# Patient Record
Sex: MALE | Race: WHITE
[De-identification: names, ages, dates, MRNs, and addresses within clinical notes are randomized per-mention and may not be internally consistent; named-entity substitution may affect disease eponyms.]

---

## 2017-01-06 ENCOUNTER — RECORDS - HEALTHEAST (OUTPATIENT)
Dept: ADMINISTRATIVE | Facility: OTHER | Age: 22
End: 2017-01-06

## 2017-07-24 ENCOUNTER — OFFICE VISIT - HEALTHEAST (OUTPATIENT)
Dept: INTERNAL MEDICINE | Facility: CLINIC | Age: 22
End: 2017-07-24

## 2017-07-24 DIAGNOSIS — L70.0 ACNE VULGARIS: ICD-10-CM

## 2017-07-24 DIAGNOSIS — R68.82 LOW LIBIDO: ICD-10-CM

## 2017-07-24 DIAGNOSIS — R53.83 FATIGUE: ICD-10-CM

## 2017-07-24 DIAGNOSIS — Z00.00 HEALTHCARE MAINTENANCE: ICD-10-CM

## 2017-07-26 ENCOUNTER — COMMUNICATION - HEALTHEAST (OUTPATIENT)
Dept: INTERNAL MEDICINE | Facility: CLINIC | Age: 22
End: 2017-07-26

## 2017-08-09 ENCOUNTER — COMMUNICATION - HEALTHEAST (OUTPATIENT)
Dept: INTERNAL MEDICINE | Facility: CLINIC | Age: 22
End: 2017-08-09

## 2017-08-09 DIAGNOSIS — F41.9 ANXIETY: ICD-10-CM

## 2017-09-11 ENCOUNTER — OFFICE VISIT - HEALTHEAST (OUTPATIENT)
Dept: BEHAVIORAL HEALTH | Facility: CLINIC | Age: 22
End: 2017-09-11

## 2017-09-11 DIAGNOSIS — F34.0 CYCLOTHYMIC DISORDER: ICD-10-CM

## 2017-09-15 ENCOUNTER — COMMUNICATION - HEALTHEAST (OUTPATIENT)
Dept: BEHAVIORAL HEALTH | Facility: CLINIC | Age: 22
End: 2017-09-15

## 2017-09-27 ENCOUNTER — OFFICE VISIT - HEALTHEAST (OUTPATIENT)
Dept: BEHAVIORAL HEALTH | Facility: CLINIC | Age: 22
End: 2017-09-27

## 2017-09-27 DIAGNOSIS — F32.A DEPRESSION: ICD-10-CM

## 2017-09-27 ASSESSMENT — MIFFLIN-ST. JEOR: SCORE: 1815.87

## 2017-11-08 ENCOUNTER — OFFICE VISIT - HEALTHEAST (OUTPATIENT)
Dept: BEHAVIORAL HEALTH | Facility: CLINIC | Age: 22
End: 2017-11-08

## 2017-11-08 DIAGNOSIS — F32.9 DEPRESSION, MAJOR: ICD-10-CM

## 2017-11-08 ASSESSMENT — MIFFLIN-ST. JEOR: SCORE: 1815.87

## 2017-12-02 ENCOUNTER — COMMUNICATION - HEALTHEAST (OUTPATIENT)
Dept: BEHAVIORAL HEALTH | Facility: CLINIC | Age: 22
End: 2017-12-02

## 2017-12-02 DIAGNOSIS — F32.A DEPRESSION: ICD-10-CM

## 2018-01-13 ENCOUNTER — OFFICE VISIT - HEALTHEAST (OUTPATIENT)
Dept: FAMILY MEDICINE | Facility: CLINIC | Age: 23
End: 2018-01-13

## 2018-01-13 DIAGNOSIS — J02.9 SORE THROAT: ICD-10-CM

## 2018-01-13 DIAGNOSIS — H69.91 ETD (EUSTACHIAN TUBE DYSFUNCTION), RIGHT: ICD-10-CM

## 2018-01-13 LAB — DEPRECATED S PYO AG THROAT QL EIA: NORMAL

## 2018-01-14 LAB — GROUP A STREP BY PCR: NORMAL

## 2018-05-17 ENCOUNTER — OFFICE VISIT - HEALTHEAST (OUTPATIENT)
Dept: FAMILY MEDICINE | Facility: CLINIC | Age: 23
End: 2018-05-17

## 2018-05-17 DIAGNOSIS — R40.0 DAYTIME SLEEPINESS: ICD-10-CM

## 2018-07-09 ENCOUNTER — OFFICE VISIT - HEALTHEAST (OUTPATIENT)
Dept: SLEEP MEDICINE | Facility: CLINIC | Age: 23
End: 2018-07-09

## 2018-07-09 DIAGNOSIS — G47.10 HYPERSOMNIA: ICD-10-CM

## 2018-07-09 DIAGNOSIS — R06.83 SNORING: ICD-10-CM

## 2018-07-09 DIAGNOSIS — G47.8 SLEEP DYSFUNCTION WITH SLEEP STAGE DISTURBANCE: ICD-10-CM

## 2018-07-09 DIAGNOSIS — R03.0 ELEVATED BLOOD PRESSURE READING: ICD-10-CM

## 2018-07-09 ASSESSMENT — MIFFLIN-ST. JEOR: SCORE: 1817.23

## 2018-08-01 ENCOUNTER — RECORDS - HEALTHEAST (OUTPATIENT)
Dept: SLEEP MEDICINE | Facility: CLINIC | Age: 23
End: 2018-08-01

## 2018-08-01 ENCOUNTER — RECORDS - HEALTHEAST (OUTPATIENT)
Dept: ADMINISTRATIVE | Facility: OTHER | Age: 23
End: 2018-08-01

## 2018-08-01 DIAGNOSIS — G47.8 OTHER SLEEP DISORDERS: ICD-10-CM

## 2018-08-01 DIAGNOSIS — R06.83 SNORING: ICD-10-CM

## 2018-08-01 DIAGNOSIS — G47.10 HYPERSOMNIA, UNSPECIFIED: ICD-10-CM

## 2018-08-09 ENCOUNTER — COMMUNICATION - HEALTHEAST (OUTPATIENT)
Dept: SLEEP MEDICINE | Facility: CLINIC | Age: 23
End: 2018-08-09

## 2018-08-13 ENCOUNTER — COMMUNICATION - HEALTHEAST (OUTPATIENT)
Dept: SLEEP MEDICINE | Facility: CLINIC | Age: 23
End: 2018-08-13

## 2018-09-13 ENCOUNTER — OFFICE VISIT - HEALTHEAST (OUTPATIENT)
Dept: SLEEP MEDICINE | Facility: CLINIC | Age: 23
End: 2018-09-13

## 2018-09-13 DIAGNOSIS — G47.10 HYPERSOMNIA: ICD-10-CM

## 2018-09-13 DIAGNOSIS — G47.8 SLEEP DYSFUNCTION WITH SLEEP STAGE DISTURBANCE: ICD-10-CM

## 2018-09-13 DIAGNOSIS — G47.21 CIRCADIAN RHYTHM SLEEP DISORDER, DELAYED SLEEP PHASE TYPE: ICD-10-CM

## 2018-09-13 ASSESSMENT — MIFFLIN-ST. JEOR: SCORE: 1802.26

## 2019-02-09 ENCOUNTER — OFFICE VISIT (OUTPATIENT)
Dept: URGENT CARE | Facility: URGENT CARE | Age: 24
End: 2019-02-09
Payer: COMMERCIAL

## 2019-02-09 ENCOUNTER — OFFICE VISIT (OUTPATIENT)
Dept: ORTHOPEDICS | Facility: CLINIC | Age: 24
End: 2019-02-09
Payer: COMMERCIAL

## 2019-02-09 ENCOUNTER — ANCILLARY PROCEDURE (OUTPATIENT)
Dept: GENERAL RADIOLOGY | Facility: CLINIC | Age: 24
End: 2019-02-09
Attending: PHYSICIAN ASSISTANT
Payer: COMMERCIAL

## 2019-02-09 VITALS
TEMPERATURE: 97.8 F | BODY MASS INDEX: 23.7 KG/M2 | SYSTOLIC BLOOD PRESSURE: 125 MMHG | OXYGEN SATURATION: 98 % | HEIGHT: 72 IN | DIASTOLIC BLOOD PRESSURE: 66 MMHG | HEART RATE: 53 BPM | WEIGHT: 175 LBS

## 2019-02-09 VITALS
HEART RATE: 53 BPM | HEIGHT: 72 IN | WEIGHT: 175 LBS | BODY MASS INDEX: 23.7 KG/M2 | SYSTOLIC BLOOD PRESSURE: 125 MMHG | DIASTOLIC BLOOD PRESSURE: 66 MMHG

## 2019-02-09 DIAGNOSIS — S62.244A CLOSED NONDISPLACED FRACTURE OF SHAFT OF FIRST METACARPAL BONE OF RIGHT HAND, INITIAL ENCOUNTER: Primary | ICD-10-CM

## 2019-02-09 DIAGNOSIS — Z01.818 PREOPERATIVE EXAMINATION: ICD-10-CM

## 2019-02-09 DIAGNOSIS — M79.641 HAND PAIN, RIGHT: ICD-10-CM

## 2019-02-09 DIAGNOSIS — M79.641 HAND PAIN, RIGHT: Primary | ICD-10-CM

## 2019-02-09 DIAGNOSIS — S62.241A CLOSED DISPLACED FRACTURE OF SHAFT OF FIRST METACARPAL BONE OF RIGHT HAND, INITIAL ENCOUNTER: ICD-10-CM

## 2019-02-09 PROCEDURE — 73130 X-RAY EXAM OF HAND: CPT | Mod: RT

## 2019-02-09 PROCEDURE — 99214 OFFICE O/P EST MOD 30 MIN: CPT | Performed by: PHYSICIAN ASSISTANT

## 2019-02-09 ASSESSMENT — MIFFLIN-ST. JEOR
SCORE: 1816.79
SCORE: 1816.79

## 2019-02-09 NOTE — PROGRESS NOTES
SUBJECTIVE:  Chief Complaint   Patient presents with     Urgent Care     Wrist Pain     c/o hand and wrist pain injury     Prince Sofia is a 23 year old male presents with a chief complaint of right wrist and hand pain.  The injury occurred 1 week(s) ago.   The injury happened while alpine skiing fast when he hit a gate with right hand. He was wearing a thinner glove and gate on the way back up from hitting the snow came up and hit his hand while he was moving his hand forward. How: sports related injury immediate pain, immediate swelling.  The patient complained of moderate pain and has had decreased ROM to right thumb.  Pain exacerbated by movement.  Relieved by nothing.  He treated it initially with ice and Ibuprofen. This is the first time this type of injury has occurred to this patient. He has had decreased pain and increased thumb mobility but that has plateaued and he wonders if there is more than soft tissue injury.      No past medical history on file.  No current outpatient medications on file.     Social History     Tobacco Use     Smoking status: Never Smoker     Smokeless tobacco: Never Used   Substance Use Topics     Alcohol use: Not on file       ROS:  CONSTITUTIONAL:NEGATIVE for fever, chills, change in weight  MUSCULOSKELETAL: arthralgias right thumb    EXAM:   /66   Pulse 53   Temp 97.8  F (36.6  C) (Tympanic)   Ht 1.829 m (6')   Wt 79.4 kg (175 lb)   SpO2 98%   BMI 23.73 kg/m    Gen: healthy, alert, no distress and healthy,alert,no distress  Extremity: finger  first has point tenderness base of metacarpal.   There is not compromise to the distal circulation.  Pulses are +2 and CRT is brisk  GENERAL APPEARANCE: healthy, alert and no distress  EXTREMITIES: peripheral pulses normal  SKIN: ecchymoses - wrist, hand and thumb on the right  NEURO: Normal strength and tone, sensory exam grossly normal, mentation intact and speech normal    X-RAY was done showing comminuted shaft fracture of  right thumb MC.    ASSESSMENT:       1. Hand pain, right    - XR Hand Right G/E 3 Views; Future  - order for DME; Equipment being ordered: splint right wrist  Dispense: 1 Device; Refill: 0    2. Closed displaced fracture of shaft of first metacarpal bone of right hand, initial encounter    - order for DME; Equipment being ordered: splint right wrist  Dispense: 1 Device; Refill: 0    PLAN:  1)  He will go to sports medicine at the [facility] now for evaluation.

## 2019-02-09 NOTE — PROGRESS NOTES
[facility] SPORTS AND ORTHOPAEDIC WALK IN CLINIC  [address]  4th Floor  [facility] [id number] [phone]    PRE-OP EVALUATION:  Today's date: 2019     Prince Sofia (: 1995) presents for pre-operative evaluation assessment as requested by Dr. Eli.  He requires evaluation and anesthesia risk assessment prior to undergoing surgery/procedure for treatment of Right first metacarpal fracture .      Proposed Surgery/ Procedure: Percutaneous pinning right first metacarpal  Date of Surgery/ Procedure: 19  Time of Surgery/ Procedure: [facility]  [place] [facility] [place]   Primary Physician: No Ref-Primary, Physician  Type of Anesthesia Anticipated: to be determined    Patient has a Health Care Directive or Living Will:  NO    1. NO - Do you have a history of heart attack, stroke, stent, bypass or surgery on an artery in the head, neck, heart or legs?  2. NO - Do you ever have any pain or discomfort in your chest?  3. NO - Do you have a history of  Heart Failure?  4. NO - Are you troubled by shortness of breath when: walking on the level, up a slight hill or at night?  5. NO - Do you currently have a cold, bronchitis or other respiratory infection?  6. NO - Do you have a cough, shortness of breath or wheezing?  7. NO - Do you sometimes get pains in the calves of your legs when you walk?  8. NO - Do you or anyone in your family have previous history of blood clots?  9. NO - Do you or does anyone in your family have a serious bleeding problem such as prolonged bleeding following surgeries or cuts?  10. NO - Have you ever had problems with anemia or been told to take iron pills?  11. NO - Have you had any abnormal blood loss such as black, tarry or bloody stools, or abnormal vaginal bleeding?  12. NO - Have you ever had a blood transfusion?  13. NO - Have you or any of your relatives ever had problems with anesthesia?  14. NO - Do you have sleep apnea, excessive snoring or daytime  drowsiness?  15. NO - Do you have any prosthetic heart valves?  16. NO - Do you have prosthetic joints?  17. NO - Is there any chance that you may be pregnant?    HPI:     HPI related to upcoming procedure: Caught right thumb in gate while downhill skiing on 2/1/19. Had some pain immediate followed by swelling and bruising. 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 Seen in urgent care earlier today and dx with fracture and sent to [facility] for further assessment.   Pain is well controlled at rest. Has taken occasional ibuprofen. Continued to work and even ski since the time of the injury. No tingling or numbness. No prior thumb injury.     See problem list for active medical problems.  Problems all longstanding and stable, except as noted/documented.  See ROS for pertinent symptoms related to these conditions.                                                                                                                                                          .    MEDICAL HISTORY:   There are no active problems to display for this patient.     History reviewed. No pertinent past medical history.  Past Surgical History:   Procedure Laterality Date     HC TOOTH EXTRACTION W/FORCEP  12/2018     Current Outpatient Medications   Medication Sig Dispense Refill     order for DME Equipment being ordered: splint right wrist 1 Device 0     OTC products: has use ibuprofen intermittently over the past week. Last use 2/8/19    Allergies   Allergen Reactions     Sulfa Drugs Other (See Comments)     Childhood reaction      Latex Allergy: NO    Social History     Tobacco Use     Smoking status: Never Smoker     Smokeless tobacco: Never Used   Substance Use Topics     Alcohol use: Yes     Alcohol/week: 1.2 oz     Types: 2 Standard drinks or equivalent per week     History   Drug Use     Frequency: 7.0 times per week     Types: Marijuana        REVIEW OF SYSTEMS:   CONSTITUTIONAL: NEGATIVE for fever, chills, change in weight  INTEGUMENTARY/SKIN: NEGATIVE for worrisome rashes, moles or lesions  EYES: NEGATIVE for vision changes or irritation  ENT/MOUTH: NEGATIVE for ear, mouth and throat problems  RESP: NEGATIVE for significant cough or SOB  BREAST: NEGATIVE for masses, tenderness or discharge  CV: NEGATIVE for chest pain, palpitations or peripheral edema  GI: NEGATIVE for nausea, abdominal pain, heartburn, or change in bowel habits  : NEGATIVE for frequency, dysuria, or hematuria  MUSCULOSKELETAL: NEGATIVE for significant arthralgias or myalgia  NEURO: NEGATIVE for weakness, dizziness or paresthesias  ENDOCRINE: NEGATIVE for temperature intolerance, skin/hair changes  HEME: NEGATIVE for bleeding problems  PSYCHIATRIC: NEGATIVE for changes in mood or affect    EXAM:   /66   Pulse 53   Ht 1.829 m (6')   Wt 79.4 kg (175 lb)   BMI 23.73 kg/m      GENERAL APPEARANCE: healthy, alert and no distress     EYES: EOMI,  PERRL     HENT: ear canals and TM's normal and nose and mouth without ulcers or lesions     NECK: no adenopathy, no asymmetry, masses, or scars and thyroid normal to palpation     RESP: lungs clear to auscultation - no rales, rhonchi or wheezes     CV: regular rates and rhythm, normal S1 S2, no S3 or S4 and no murmur, click or rub     ABDOMEN:  soft, nontender, no HSM or masses and bowel sounds normal     MS: Large soft tissue swelling and ecchymosis of right thumb over the thenar eminence and palm. SILT in thumb. Able to oppose, extend, abduct, adduct thumb against resistance. ttp over base of first metacarpal. Otherwise extremities normal- no gross deformities noted, no evidence of inflammation in joints, FROM in all extremities.      SKIN: no suspicious lesions or rashes     NEURO: Normal strength and tone, sensory exam grossly normal, mentation intact and speech normal     PSYCH: mentation appears normal. and affect  normal/bright     LYMPHATICS: No cervical adenopathy    DIAGNOSTICS:   EKG: Not indicated due to non-vascular surgery and low risk of event (age <65 and without cardiac risk factors)    Labs: not indicated    IMPRESSION:   Reason for surgery/procedure: right first metacarpal base fracture    The proposed surgical procedure is considered LOW risk.    REVISED CARDIAC RISK INDEX  The patient has the following serious cardiovascular risks for perioperative complications such as (MI, PE, VFib and 3  AV Block):  No serious cardiac risks  INTERPRETATION: 0 risks: Class I (very low risk - 0.4% complication rate)    The patient has the following additional risks for perioperative complications:  No identified additional risks      ICD-10-CM    1. Closed nondisplaced fracture of shaft of first metacarpal bone of right hand S62.244A Cast/splint application       RECOMMENDATIONS:   --Discussed with Dr. Howard Eli, on call hand surgeon  --Patient is on no chronic medications  --Discontinue NSAID use. Acetaminophen ok  --NPO after early breakfast on 2/11/19  --clinic will contact patient with surgical time on the day of surgery  -- placed in thumb spica splint to remain in place until the time of surgery.     APPROVAL GIVEN to proceed with proposed procedure, without further diagnostic evaluation       Signed Electronically by: Junior Gonzalez MD    Copy of this evaluation report is provided to requesting physician.    [facility] Preop Guidelines    Revised Cardiac Risk Index

## 2019-02-09 NOTE — PROGRESS NOTES
SPORTS & ORTHOPEDIC WALK-IN VISIT 2/9/2019    Primary Care Physician:      Hit hand on a gate while skiing a week ago. Didn't think it was that big of a deal, had been getting better but plateaued. Pain, swelling, bruising. Most of pain is over first  metacarpal, especially with thumb flexion and adduction. Also has some mid hand pain.     Reason for visit:     What part of your body is injured / painful?  right thumb    What caused the injury /pain? Fall    How long ago did your injury occur or pain begin? a week ago    What are your most bothersome symptoms? Pain, Swelling and Numbness    How would you characterize your symptom?  aching    What makes your symptoms better? Ice and Ibuprofen    What makes your symptoms worse? Movement and Other: palpation    Have you been previously seen for this problem? Yes, [facility]    Medical History:    Any recent changes to your medical history? No    Any new medication prescribed since last visit? No    Have you had surgery on this body part before? No    Social History:    Occupation: [employer] -  in [employer]    Handedness: Right    Exercise: skiing in winter 3-5 days a week, soccer    Review of Systems:    Do you have fever, chills, weight loss? No    Do you have any vision problems? No    Do you have any chest pain or edema? No    Do you have any shortness of breath or wheezing?  No    Do you have stomach problems? No    Do you have any numbness or focal weakness? No    Do you have diabetes? No    Do you have problems with bleeding or clotting? No    Do you have an rashes or other skin lesions? No       Cast/splint application  Date/Time: 2/9/2019 1:15 PM  Performed by: Katia Balderas ATC  Authorized by: Junior Gonzalez MD     Consent:     Consent obtained:  Verbal    Consent given by:  Patient  Procedure details:     Laterality:  Right    Location: thumb.    Splint type: thumb spica.    Supplies used: orthoglass.  Post-procedure details:      Sensation:  Normal    Patient tolerance of procedure:  Tolerated well, no immediate complications    Patient provided with cast or splint care instructions: Yes

## 2019-02-11 ENCOUNTER — TELEPHONE (OUTPATIENT)
Dept: PLASTIC SURGERY | Facility: CLINIC | Age: 24
End: 2019-02-11

## 2019-02-11 DIAGNOSIS — S62.221A CLOSED DISPLACED ROLANDO'S FRACTURE OF RIGHT THUMB, INITIAL ENCOUNTER: Primary | ICD-10-CM

## 2019-02-11 RX ORDER — CEFAZOLIN SODIUM 2 G/50ML
2 SOLUTION INTRAVENOUS
Status: CANCELLED | OUTPATIENT
Start: 2019-02-11

## 2019-02-11 RX ORDER — CEFAZOLIN SODIUM 1 G/50ML
1 INJECTION, SOLUTION INTRAVENOUS SEE ADMIN INSTRUCTIONS
Status: CANCELLED | OUTPATIENT
Start: 2019-02-11

## 2019-02-11 NOTE — TELEPHONE ENCOUNTER
Spoke with patient to schedule surgery with Dr Howard Eli    [facility]    Patient will have Pre-Op with N?A per Dr Eli not needed  Post-Op care appointment scheduled?  YES on 2/26    Patient is aware a / is needed day of surgery.     Surgery packet was sent via mail, patient has my direct contact information for any further questions.     Tiera Pompa  Surgical [facility]-Op Coordinator  [phone]

## 2019-02-11 NOTE — TELEPHONE ENCOUNTER
"RECORDS RECEIVED FROM: \"consult\" - right thumb    DATE RECEIVED: 02/11/19    NOTES STATUS DETAILS   OFFICE NOTE from referring provider Internal 2/9/19   OFFICE NOTE from other specialist N/A    DISCHARGE SUMMARY from hospital N/A    DISCHARGE REPORT from the ER N/A    OPERATIVE REPORT N/A    MEDICATION LIST Internal    IMPLANT RECORD/STICKER Internal    LABS     CBC/DIFF N/A    CULTURES N/A    INJECTIONS DONE IN RADIOLOGY N/A    MRI N/A    CT SCAN N/A    XRAYS (IMAGES & REPORTS) Internal 2/9/19   TUMOR     PATHOLOGY  Slides & report N/A        "

## 2019-02-12 ENCOUNTER — OFFICE VISIT (OUTPATIENT)
Dept: ORTHOPEDICS | Facility: CLINIC | Age: 24
End: 2019-02-12
Payer: COMMERCIAL

## 2019-02-12 ENCOUNTER — PRE VISIT (OUTPATIENT)
Dept: ORTHOPEDICS | Facility: CLINIC | Age: 24
End: 2019-02-12

## 2019-02-12 VITALS — WEIGHT: 180.7 LBS | HEIGHT: 73 IN | BODY MASS INDEX: 23.95 KG/M2

## 2019-02-12 DIAGNOSIS — S62.221A CLOSED DISPLACED ROLANDO'S FRACTURE OF RIGHT THUMB, INITIAL ENCOUNTER: Primary | ICD-10-CM

## 2019-02-12 ASSESSMENT — MIFFLIN-ST. JEOR: SCORE: 1850.59

## 2019-02-12 NOTE — NURSING NOTE
Teaching Flowsheet   Relevant Diagnosis: Right thumb metacarpal fracture.  Teaching Topic: Right closed reduction percutaneous pinning versus ORIF of thumb metacarpal fracture.    Patient's health history is negative per review. The plan is for him to have a regional block; however, instructed patient to remain NPO after midnight and quit clear liquids 2 hours before to be safe.     Person(s) involved in teaching:   Patient     Motivation Level:  Asks Questions: Yes  Eager to Learn: Yes  Cooperative: Yes  Receptive (willing/able to accept information): Yes  Any cultural factors/Samaritan beliefs that may influence understanding or compliance? No     Patient demonstrates understanding of the following:  Reason for the appointment, diagnosis and treatment plan: Yes  Knowledge of proper use of medications and conditions for which they are ordered (with special attention to potential side effects or drug interactions): Yes  Which situations necessitate calling provider and whom to contact: Yes     Teaching Concerns Addressed: Patient had his preop H&P in the walk-in clinic with Dr. Gonzalez.     Proper use and care of (N/A) (medical equip, care aids, etc.): NA  Nutritional needs and diet plan: NA  Pain management techniques: Yes  Wound Care: Yes  How and/when to access community resources: Yes     Instructional Materials Used/Given: Preoperative teaching packet, surgical soap.

## 2019-02-12 NOTE — PROGRESS NOTES
"REFERRING PROVIDER: Caesar Burnette PA-C    REASON FOR CONSULTATION: Right thumb Mio fracture.    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  Patient reports that he sustained this fracture while skiing a week and half ago.  A gate smashed into his right thumb.  He presented a week late after injury because it did not hurt that much.  He denies any numbness or tingling.  He has been splinted by the emergency room staff.  He hopes to continue being active with skiing and have good use of his right thumb.    MEDS:   Prior to Admission medications    Medication Sig Start Date End Date Taking? Authorizing Provider   order for DME Equipment being ordered: splint right wrist 2/9/19 2/9/20 Yes Caesar Burnette PA-C       ALLERGIES:   Allergies   Allergen Reactions     Sulfa Drugs Other (See Comments)     Childhood reaction       PMH: No past medical history on file.    PSH:   Past Surgical History:   Procedure Laterality Date     HC TOOTH EXTRACTION W/FORCEP  12/2018       SH:   Social History     Tobacco Use     Smoking status: Never Smoker     Smokeless tobacco: Never Used   Substance Use Topics     Alcohol use: Yes     Alcohol/week: 1.2 oz     Types: 2 Standard drinks or equivalent per week       FH: No family history on file.    ROS: Denies chest pain, shortness of breath, MI, CVA, diabetes, DVT, PE, and bleeding disorders.    PHYSICAL EXAMINATION: Ht 1.842 m (6' 0.5\")   Wt 82 kg (180 lb 11.2 oz)   BMI 24.17 kg/m    General: No acute distress.  On examination of the right upper extremity, he is in a thumb spica splint.  Exposed fingers are well-perfused.  He is able to wiggle his fingers.  Sensation to his fingertips is intact.    IMAGING: X-ray imaging of his right hand was reviewed from February 9, 2019.  This reveals a displaced right thumb base Mio " fracture.    ASSESSMENT: Delayed presentation of closed right thumb metacarpal base Mio fracture, displaced.    PLAN: We discussed our options today.  I recommended closed reduction percutaneous pinning versus open reduction internal fixation of his right thumb metacarpal base Mio fracture.  I recommended this given that this is an unstable fracture inherently and that he may develop CMC arthritis if this is not reduced correctly.  I explained the procedure in detail today.  This can be performed as an outpatient at the ambulatory surgery center.  In fact, he is scheduled for this Thursday.  I explained the risks to include bleeding, infection, injury to surrounding structures, malunion, nonunion, tendon rupture, chronic pain, chronic stiffness, and need for revision surgery.  Patient accepts the associated risks and wishes to proceed with surgery.  He may undergo monitored anesthesia care with a regional block.  All questions were answered.    Total time spent with patient was 30 min of which greater than 50% was in counseling.  Answers for HPI/ROS submitted by the patient on 2/12/2019   General Symptoms: No  Skin Symptoms: No  HENT Symptoms: No  EYE SYMPTOMS: No  HEART SYMPTOMS: No  LUNG SYMPTOMS: No  INTESTINAL SYMPTOMS: No  URINARY SYMPTOMS: No  REPRODUCTIVE SYMPTOMS: No  SKELETAL SYMPTOMS: No  BLOOD SYMPTOMS: No  NERVOUS SYSTEM SYMPTOMS: No  MENTAL HEALTH SYMPTOMS: No

## 2019-02-12 NOTE — LETTER
Date:February 13, 2019      Patient was self referred, no letter generated. Do not send.        [facility] Information

## 2019-02-12 NOTE — LETTER
"2/12/2019       RE: Prince Sofia  [address]  [place] [address]     Dear Colleague,    Thank you for referring your patient, Prince Sofia, to the [facility] at [facility]. Please see a copy of my visit note below.    REFERRING PROVIDER: Caesar Burnette PA-C    REASON FOR CONSULTATION: Right thumb Mio fracture.    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  Patient reports that he sustained this fracture while skiing a week and half ago.  A gate smashed into his right thumb.  He presented a week late after injury because it did not hurt that much.  He denies any numbness or tingling.  He has been splinted by the emergency room staff.  He hopes to continue being active with skiing and have good use of his right thumb.    MEDS:   Prior to Admission medications    Medication Sig Start Date End Date Taking? Authorizing Provider   order for DME Equipment being ordered: splint right wrist 2/9/19 2/9/20 Yes Caesar Burnette PA-C       ALLERGIES:   Allergies   Allergen Reactions     Sulfa Drugs Other (See Comments)     Childhood reaction       PMH: No past medical history on file.    PSH:   Past Surgical History:   Procedure Laterality Date     HC TOOTH EXTRACTION W/FORCEP  12/2018       SH:   Social History     Tobacco Use     Smoking status: Never Smoker     Smokeless tobacco: Never Used   Substance Use Topics     Alcohol use: Yes     Alcohol/week: 1.2 oz     Types: 2 Standard drinks or equivalent per week       FH: No family history on file.    ROS: Denies chest pain, shortness of breath, MI, CVA, diabetes, DVT, PE, and bleeding disorders.    PHYSICAL EXAMINATION: Ht 1.842 m (6' 0.5\")   Wt 82 kg (180 lb 11.2 oz)   BMI 24.17 kg/m     General: No acute distress.  On examination of the right upper extremity, he is in a " thumb spica splint.  Exposed fingers are well-perfused.  He is able to wiggle his fingers.  Sensation to his fingertips is intact.    IMAGING: X-ray imaging of his right hand was reviewed from February 9, 2019.  This reveals a displaced right thumb base Mio fracture.    ASSESSMENT: Delayed presentation of closed right thumb metacarpal base Mio fracture, displaced.    PLAN: We discussed our options today.  I recommended closed reduction percutaneous pinning versus open reduction internal fixation of his right thumb metacarpal base Mio fracture.  I recommended this given that this is an unstable fracture inherently and that he may develop CMC arthritis if this is not reduced correctly.  I explained the procedure in detail today.  This can be performed as an outpatient at the ambulatory surgery center.  In fact, he is scheduled for this Thursday.  I explained the risks to include bleeding, infection, injury to surrounding structures, malunion, nonunion, tendon rupture, chronic pain, chronic stiffness, and need for revision surgery.  Patient accepts the associated risks and wishes to proceed with surgery.  He may undergo monitored anesthesia care with a regional block.  All questions were answered.    Total time spent with patient was 30 min of which greater than 50% was in counseling.  Answers for HPI/ROS submitted by the patient on 2/12/2019   General Symptoms: No  Skin Symptoms: No  HENT Symptoms: No  EYE SYMPTOMS: No  HEART SYMPTOMS: No  LUNG SYMPTOMS: No  INTESTINAL SYMPTOMS: No  URINARY SYMPTOMS: No  REPRODUCTIVE SYMPTOMS: No  SKELETAL SYMPTOMS: No  BLOOD SYMPTOMS: No  NERVOUS SYSTEM SYMPTOMS: No  MENTAL HEALTH SYMPTOMS: No      Again, thank you for allowing me to participate in the care of your patient.      Sincerely,    Howard Eli MD

## 2019-02-12 NOTE — NURSING NOTE
"Reason For Visit:   Chief Complaint   Patient presents with     Right Hand - Eval/Assessment     Consult     DOI 2/1/19 , saw [facility] 2/9/19Closed nondisplaced fracture of shaft of first metacarpal bone of right hand       Primary MD: No Ref-Primary, Physician  Ref. MD: Junior Gonzalez    Age: 23 year old    ?  No      Ht 1.842 m (6' 0.5\")   Wt 82 kg (180 lb 11.2 oz)   BMI 24.17 kg/m        Pain Assessment  Patient Currently in Pain: No               QuickDASH Assessment  No flowsheet data found.       Current Outpatient Medications   Medication Sig Dispense Refill     order for DME Equipment being ordered: splint right wrist 1 Device 0       Allergies   Allergen Reactions     Sulfa Drugs Other (See Comments)     Childhood reaction       Ina Horvath, ATC    "

## 2019-02-13 ENCOUNTER — ANESTHESIA EVENT (OUTPATIENT)
Dept: SURGERY | Facility: AMBULATORY SURGERY CENTER | Age: 24
End: 2019-02-13

## 2019-02-13 ASSESSMENT — LIFESTYLE VARIABLES: TOBACCO_USE: 0

## 2019-02-13 NOTE — ANESTHESIA PREPROCEDURE EVALUATION
"Anesthesia Pre-Procedure Evaluation    Patient: Prince Sofia   MRN:     [id number] Gender:   male   Age:    23 year old :      1995        Preoperative Diagnosis: Right Thumb Metacarpal Fracture   Procedure(s):  Closed Reduction Percutaneous Pinning Versus Open Reduction Internal Fixation of Right Thumb Metacarpal Fracture     History reviewed. No pertinent past medical history.   Past Surgical History:   Procedure Laterality Date     HC TOOTH EXTRACTION W/FORCEP  2018          Anesthesia Evaluation     . Pt has had prior anesthetic.            ROS/MED HX    ENT/Pulmonary:  - neg pulmonary ROS    (-) tobacco use   Neurologic:  - neg neurologic ROS     Cardiovascular:  - neg cardiovascular ROS       METS/Exercise Tolerance:  >4 METS   Hematologic:  - neg hematologic  ROS       Musculoskeletal: Comment: Right Thumb Metacarpal Fracture        GI/Hepatic:  - neg GI/hepatic ROS       Renal/Genitourinary:  - ROS Renal section negative       Endo:  - neg endo ROS       Psychiatric:  - neg psychiatric ROS       Infectious Disease:  - neg infectious disease ROS       Malignancy:      - no malignancy   Other:                     JZG FV AN PHYSICAL EXAM    No results found for: WBC, HGB, HCT, PLT, CRP, SED, NA, POTASSIUM, CHLORIDE, CO2, BUN, CR, GLC, PATRIA, PHOS, MAG, ALBUMIN, PROTTOTAL, ALT, AST, GGT, ALKPHOS, BILITOTAL, BILIDIRECT, LIPASE, AMYLASE, BRITANY, PTT, INR, FIBR, TSH, T4, T3, HCG, HCGS, CKTOTAL, CKMB, TROPN    Preop Vitals  BP Readings from Last 3 Encounters:   19 125/66   19 125/66    Pulse Readings from Last 3 Encounters:   19 53   19 53      Resp Readings from Last 3 Encounters:   No data found for Resp    SpO2 Readings from Last 3 Encounters:   19 98%      Temp Readings from Last 1 Encounters:   19 36.6  C (97.8  F) (Tympanic)    Ht Readings from Last 1 Encounters:   19 1.842 m (6' 0.5\")      Wt Readings from Last 1 Encounters:   19 82 kg (180 lb 11.2 oz) " "   Estimated body mass index is 24.17 kg/m  as calculated from the following:    Height as of 2/12/19: 1.842 m (6' 0.5\").    Weight as of 2/12/19: 82 kg (180 lb 11.2 oz).     LDA:            Assessment:   ASA SCORE: 1       Documentation: H&P complete; Preop Testing complete; Consents complete   Proceeding: Proceed without further delay  Tobacco Use:  NO Active use of Tobacco/UNKNOWN Tobacco use status     Plan:   Anes. Type:  General; Regional     RA Details:  FOR POSTOP PAIN CONTROL; Pre Induction; R; SS; Exparel     RA-Location/Type: Nerve Block; Supraclavicular   Pre-Induction: Midazolam IV; Acetaminophen PO   Induction:  IV (Standard); Propofol   Airway: LMA   Access/Monitoring: PIV   Maintenance: Balanced   Emergence: Procedure Site   Logistics: Same Day Surgery     Postop Pain/Sedation Strategy:  Standard-Options: Opioids PRN     PONV Management:  Adult Risk Factors:, Non-Smoker, Postop Opioids  Prevention: Ondansetron     CONSENT:      Blood Products: N/a       Comments for Plan/Consent:  24 yo for Closed Reduction Percutaneous Pinning Versus Open Reduction Internal Fixation of Right Thumb Metacarpal Fracture (Right Hand) under GA/LMA/regional block                         Estrada Eli MD  "

## 2019-02-14 ENCOUNTER — HOSPITAL ENCOUNTER (OUTPATIENT)
Facility: AMBULATORY SURGERY CENTER | Age: 24
End: 2019-02-14
Attending: PLASTIC SURGERY
Payer: COMMERCIAL

## 2019-02-14 ENCOUNTER — ANCILLARY PROCEDURE (OUTPATIENT)
Dept: RADIOLOGY | Facility: AMBULATORY SURGERY CENTER | Age: 24
End: 2019-02-14
Attending: PLASTIC SURGERY
Payer: COMMERCIAL

## 2019-02-14 ENCOUNTER — ANESTHESIA (OUTPATIENT)
Dept: SURGERY | Facility: AMBULATORY SURGERY CENTER | Age: 24
End: 2019-02-14

## 2019-02-14 VITALS
BODY MASS INDEX: 24.38 KG/M2 | WEIGHT: 180 LBS | HEART RATE: 60 BPM | OXYGEN SATURATION: 98 % | DIASTOLIC BLOOD PRESSURE: 83 MMHG | TEMPERATURE: 98.1 F | SYSTOLIC BLOOD PRESSURE: 129 MMHG | HEIGHT: 72 IN | RESPIRATION RATE: 16 BRPM

## 2019-02-14 DIAGNOSIS — R52 PAIN: ICD-10-CM

## 2019-02-14 DIAGNOSIS — S62.221A CLOSED DISPLACED ROLANDO'S FRACTURE OF RIGHT THUMB, INITIAL ENCOUNTER: Primary | ICD-10-CM

## 2019-02-14 RX ORDER — SODIUM CHLORIDE, SODIUM LACTATE, POTASSIUM CHLORIDE, CALCIUM CHLORIDE 600; 310; 30; 20 MG/100ML; MG/100ML; MG/100ML; MG/100ML
INJECTION, SOLUTION INTRAVENOUS CONTINUOUS
Status: DISCONTINUED | OUTPATIENT
Start: 2019-02-14 | End: 2019-02-14 | Stop reason: HOSPADM

## 2019-02-14 RX ORDER — PROPOFOL 10 MG/ML
INJECTION, EMULSION INTRAVENOUS CONTINUOUS PRN
Status: DISCONTINUED | OUTPATIENT
Start: 2019-02-14 | End: 2019-02-14

## 2019-02-14 RX ORDER — ONDANSETRON 2 MG/ML
4 INJECTION INTRAMUSCULAR; INTRAVENOUS EVERY 30 MIN PRN
Status: DISCONTINUED | OUTPATIENT
Start: 2019-02-14 | End: 2019-02-15 | Stop reason: HOSPADM

## 2019-02-14 RX ORDER — NALOXONE HYDROCHLORIDE 0.4 MG/ML
.1-.4 INJECTION, SOLUTION INTRAMUSCULAR; INTRAVENOUS; SUBCUTANEOUS
Status: DISCONTINUED | OUTPATIENT
Start: 2019-02-14 | End: 2019-02-14 | Stop reason: HOSPADM

## 2019-02-14 RX ORDER — CEFAZOLIN SODIUM 1 G/50ML
1 SOLUTION INTRAVENOUS SEE ADMIN INSTRUCTIONS
Status: DISCONTINUED | OUTPATIENT
Start: 2019-02-14 | End: 2019-02-14 | Stop reason: HOSPADM

## 2019-02-14 RX ORDER — NALOXONE HYDROCHLORIDE 0.4 MG/ML
.1-.4 INJECTION, SOLUTION INTRAMUSCULAR; INTRAVENOUS; SUBCUTANEOUS
Status: DISCONTINUED | OUTPATIENT
Start: 2019-02-14 | End: 2019-02-15 | Stop reason: HOSPADM

## 2019-02-14 RX ORDER — SODIUM CHLORIDE, SODIUM LACTATE, POTASSIUM CHLORIDE, CALCIUM CHLORIDE 600; 310; 30; 20 MG/100ML; MG/100ML; MG/100ML; MG/100ML
INJECTION, SOLUTION INTRAVENOUS CONTINUOUS
Status: DISCONTINUED | OUTPATIENT
Start: 2019-02-14 | End: 2019-02-15 | Stop reason: HOSPADM

## 2019-02-14 RX ORDER — OXYCODONE HYDROCHLORIDE 5 MG/1
5-10 TABLET ORAL EVERY 4 HOURS PRN
Qty: 12 TABLET | Refills: 0 | Status: SHIPPED | OUTPATIENT
Start: 2019-02-14 | End: 2019-02-24

## 2019-02-14 RX ORDER — OXYCODONE HYDROCHLORIDE 5 MG/1
5 TABLET ORAL
Status: DISCONTINUED | OUTPATIENT
Start: 2019-02-14 | End: 2019-02-15 | Stop reason: HOSPADM

## 2019-02-14 RX ORDER — CEFAZOLIN SODIUM 2 G/50ML
2 SOLUTION INTRAVENOUS
Status: COMPLETED | OUTPATIENT
Start: 2019-02-14 | End: 2019-02-14

## 2019-02-14 RX ORDER — ONDANSETRON 4 MG/1
4 TABLET, ORALLY DISINTEGRATING ORAL
Status: DISCONTINUED | OUTPATIENT
Start: 2019-02-14 | End: 2019-02-15 | Stop reason: HOSPADM

## 2019-02-14 RX ORDER — ONDANSETRON 2 MG/ML
INJECTION INTRAMUSCULAR; INTRAVENOUS PRN
Status: DISCONTINUED | OUTPATIENT
Start: 2019-02-14 | End: 2019-02-14

## 2019-02-14 RX ORDER — HYDROXYZINE HYDROCHLORIDE 25 MG/1
25 TABLET, FILM COATED ORAL
Status: DISCONTINUED | OUTPATIENT
Start: 2019-02-14 | End: 2019-02-15 | Stop reason: HOSPADM

## 2019-02-14 RX ORDER — OXYCODONE HYDROCHLORIDE 5 MG/1
5 TABLET ORAL EVERY 4 HOURS PRN
Status: DISCONTINUED | OUTPATIENT
Start: 2019-02-14 | End: 2019-02-15 | Stop reason: HOSPADM

## 2019-02-14 RX ORDER — LIDOCAINE 40 MG/G
CREAM TOPICAL
Status: DISCONTINUED | OUTPATIENT
Start: 2019-02-14 | End: 2019-02-14 | Stop reason: HOSPADM

## 2019-02-14 RX ORDER — MEPERIDINE HYDROCHLORIDE 25 MG/ML
12.5 INJECTION INTRAMUSCULAR; INTRAVENOUS; SUBCUTANEOUS
Status: DISCONTINUED | OUTPATIENT
Start: 2019-02-14 | End: 2019-02-15 | Stop reason: HOSPADM

## 2019-02-14 RX ORDER — FENTANYL CITRATE 50 UG/ML
25-50 INJECTION, SOLUTION INTRAMUSCULAR; INTRAVENOUS
Status: DISCONTINUED | OUTPATIENT
Start: 2019-02-14 | End: 2019-02-14 | Stop reason: HOSPADM

## 2019-02-14 RX ORDER — FLUMAZENIL 0.1 MG/ML
0.2 INJECTION, SOLUTION INTRAVENOUS
Status: DISCONTINUED | OUTPATIENT
Start: 2019-02-14 | End: 2019-02-14 | Stop reason: HOSPADM

## 2019-02-14 RX ORDER — ONDANSETRON 4 MG/1
4 TABLET, ORALLY DISINTEGRATING ORAL EVERY 30 MIN PRN
Status: DISCONTINUED | OUTPATIENT
Start: 2019-02-14 | End: 2019-02-15 | Stop reason: HOSPADM

## 2019-02-14 RX ORDER — ACETAMINOPHEN 325 MG/1
975 TABLET ORAL ONCE
Status: COMPLETED | OUTPATIENT
Start: 2019-02-14 | End: 2019-02-14

## 2019-02-14 RX ORDER — IBUPROFEN 200 MG
600 TABLET ORAL
Status: DISCONTINUED | OUTPATIENT
Start: 2019-02-14 | End: 2019-02-15 | Stop reason: HOSPADM

## 2019-02-14 RX ORDER — GABAPENTIN 300 MG/1
300 CAPSULE ORAL ONCE
Status: COMPLETED | OUTPATIENT
Start: 2019-02-14 | End: 2019-02-14

## 2019-02-14 RX ADMIN — ONDANSETRON 4 MG: 2 INJECTION INTRAMUSCULAR; INTRAVENOUS at 12:23

## 2019-02-14 RX ADMIN — GABAPENTIN 300 MG: 300 CAPSULE ORAL at 10:51

## 2019-02-14 RX ADMIN — ACETAMINOPHEN 975 MG: 325 TABLET ORAL at 10:51

## 2019-02-14 RX ADMIN — FENTANYL CITRATE 50 MCG: 50 INJECTION, SOLUTION INTRAMUSCULAR; INTRAVENOUS at 11:39

## 2019-02-14 RX ADMIN — PROPOFOL 125 MCG/KG/MIN: 10 INJECTION, EMULSION INTRAVENOUS at 12:21

## 2019-02-14 RX ADMIN — SODIUM CHLORIDE, SODIUM LACTATE, POTASSIUM CHLORIDE, CALCIUM CHLORIDE: 600; 310; 30; 20 INJECTION, SOLUTION INTRAVENOUS at 10:53

## 2019-02-14 RX ADMIN — CEFAZOLIN SODIUM 2 G: 2 SOLUTION INTRAVENOUS at 12:24

## 2019-02-14 ASSESSMENT — MIFFLIN-ST. JEOR: SCORE: 1839.47

## 2019-02-14 NOTE — DISCHARGE INSTRUCTIONS
"[facility]  Home Care Following Anesthesia  For 24 hours after surgery:  1. Get plenty of rest.  A responsible adult must stay with you for at least 24 hours after you leave the surgery center.  2. Do not drive or use heavy equipment.  If you have weakness or tingling, don't drive or use heavy equipment until this feeling goes away.   3. Do not drink alcohol.   4. Avoid strenuous or risky activities.  Ask for help when climbing stairs.  5. You may feel lightheaded.  IF so, sit for a few minutes before standing.  Have someone help you get up.   6. If you have nausea (feel sick to your stomach): Drink only clear liquids such as apple juice, ginger ale, broth or 7-Up.  Rest may also help.  Be sure to drink enough fluids.  Move to a regular diet as you feel able.   7. You may have a slight fever.  Call the doctor if your fever is over 100 F (37.7 C) (taken under the tongue) or lasts longer than 24 hours.  8. You may have a dry mouth, a sore throat, muscle aches or trouble sleeping. These should go away after 24 hours.  9. Do not make important or legal decisions.   Today you received a Marcaine or bupivacaine block to numb the nerves near your surgery site.  This is a block using local anesthetic or \"numbing\" medication injected around the nerves to anesthetize or \"numb\" the area supplied by those nerves.  This block is injected into the muscle layer near your surgical site.  The medication may numb the location where you had surgery for 6-18 hours, but may last up to 24 hours.  If your surgical site is an arm or leg you should be careful with your affected limb, since it is possible to injure your limb without being aware of it due to the numbing.  Until full feeling returns, you should guard against bumping or hitting your limb, and avoid extreme hot or cold temperatures on the skin.  As the block wears off, the feeling will return as a tingling or prickly sensation near your " surgical site.  You will experience more discomfort from your incision as the feeling returns.  You may want to take a pain pill (a narcotic or Tylenol if this was prescribed by your surgeon) when you start to experience mild pain before the pain beccomes more severe.  If your pain medications do not control your pain you should notifiy your surgeon.    Tips for taking pain medications  To get the best pain relief possible, remember these points:    Take pain medications as directed, before pain becomes severe.    Pain medication can upset your stomach: taking it with food may help.    Constipation is a common side effect of pain medication. Drink plenty of  fluids.    Eat foods high in fiber. Take a stool softener if recommended by your doctor or pharmacist.    Do not drink alcohol, drive or operate machinery while taking pain medications.    Ask about other ways to control pain, such as with heat, ice or relaxation.    Tylenol/Acetaminophen Consumption  To help encourage the safe use of acetaminophen, the makers of TYLENOL  have lowered the maximum daily dose for single-ingredient Extra Strength TYLENOL  (acetaminophen) products sold in the [place] from 8 pills per day (4,000 mg) to 6 pills per day (3,000 mg). The dosing interval has also changed from 2 pills every 4-6 hours to 2 pills every 6 hours.    If you feel your pain relief is insufficient, you may take Tylenol/Acetaminophen in addition to your narcotic pain medication.     Be careful not to exceed 3,000 mg of Tylenol/Acetaminophen in a 24 hour period from all sources.    If you are taking extra strength Tylenol/acetaminophen (500 mg), the maximum dose is 6 tablets in 24 hours.    If you are taking regular strength acetaminophen (325 mg), the maximum dose is 9 tablets in 24 hours.    Call a doctor for any of the followin. Signs of infection (fever, growing tenderness at the surgery site, a large amount of drainage or bleeding, severe pain, foul-smelling  drainage, redness, swelling).  2. It has been over 8 to 10 hours since surgery and you are still not able to urinate (pass water).  3. Headache for over 24 hours.  Your doctor is:  Dr. Howard Eli, Plastic Surgery: [phone]                Or dial [phone] and ask for the resident on call for:  Orthopaedics  For emergency care, call the:  [facility]:  [phone] (TTY for hearing impaired: [phone])    Post Operative Instructions: Regional Anesthetic for Upper Extremity    General Information:   Regional anesthesia is when local anesthetic or  numbing  medication is injected around the nerves to anesthetize or  numb  the area supplied by that set of nerves.      Types of Regional Blocks:  Interscalene: A block injected into the neck on the operative shoulder/arm of a patient having shoulder surgery  Supraclavicular: A block injected near the clavicle on the operative shoulder of a patient having elbow, forearm, or hand surgery    Procedure:  The type of anesthesia your doctor used to numb your shoulder or arm will usually not wear off for 6-18 hours, but may last as long as 24 hours. You should be careful during that period, since it is possible to injure your arm without being aware of the injury. While your arm is numb, you should:    Avoid striking or bumping your arm    Avoid extreme hot or cold    Diet:  There are no restrictions on your diet. You should drink plenty of fluids.     Discomfort:  You will have a tingling and prickly sensation in your arm as the feeling begins to return. You can also expect some discomfort. The amount of discomfort is unpredictable, but if you have more pain than can be controlled with pain medication you should notify your physician.     Pain Medicine:   Begin taking your oral pain pills (if you have not already done so) before bedtime and during the night to avoid a sudden onset of pain as the block wears off.  Do not engage in drinking, driving,  or hazardous occupations while taking pain medication.     Stitches:   You may have stitches or special skin closures. You doctor will inform you when to return to the office to have them removed.     Activity:  On the day of surgery you should try to stay in bed with your hand elevated on pillows. You may resume your normal activity after that, wearing a sling for comfort. Contact your physician if you have any of the problems:     Continued numbness or tingling in the arm or hand after 24 hours    Swelling of the fingers or fingers that are cold to the touch    Excessive bleeding or drainage    Severe pain

## 2019-02-14 NOTE — ANESTHESIA POSTPROCEDURE EVALUATION
Anesthesia POST Procedure Evaluation    Patient: Prince Sofia   MRN:     [id number] Gender:   male   Age:    23 year old :      1995        Preoperative Diagnosis: Right Thumb Metacarpal Fracture   Procedure(s):  Closed Reduction Percutaneous Pinning   Postop Comments: No value filed.       Anesthesia Type:  General, Regional    Reportable Event: NO     PAIN: Uncomplicated   Sign Out status: Comfortable, Well controlled pain     PONV: No PONV   Sign Out status:  No Nausea or Vomiting     Neuro/Psych: Uneventful perioperative course   Sign Out Status: Preoperative baseline; Age appropriate mentation     Airway/Resp.: Uneventful perioperative course   Sign Out Status: Non labored breathing, age appropriate RR; Resp. Status within EXPECTED Parameters     CV: Uneventful perioperative course   Sign Out status: Appropriate BP and perfusion indices; Appropriate HR/Rhythm     Disposition:   Sign Out in:  PACU  Disposition:  Phase II; Home  Recovery Course: Uneventful  Follow-Up: Not required           Last Anesthesia Record Vitals:  CRNA VITALS  2019 1250 - 2019 1350      2019             Resp Rate (set):  10          Last PACU/Preop Vitals:  Vitals:    19 1145 19 1322 19 1352   BP: 119/68 112/50 129/83   Pulse: 60     Resp: 11 14    Temp:  36  C (96.8  F)    SpO2: 95% 97%          Electronically Signed By: Parminder Simmons MD, 2019, 2:17 PM

## 2019-02-14 NOTE — ANESTHESIA CARE TRANSFER NOTE
Patient: Prince Sofia    Procedure(s):  Closed Reduction Percutaneous Pinning    Diagnosis: Right Thumb Metacarpal Fracture  Diagnosis Additional Information: No value filed.    Anesthesia Type:   No value filed.     Note:  Airway :Room Air  Patient transferred to:Phase II  Handoff Report: Identifed the Patient, Identified the Reponsible Provider, Reviewed the pertinent medical history, Discussed the surgical course, Reviewed Intra-OP anesthesia mangement and issues during anesthesia, Set expectations for post-procedure period and Allowed opportunity for questions and acknowledgement of understanding      Vitals: (Last set prior to Anesthesia Care Transfer)    CRNA VITALS  2/14/2019 1250 - 2/14/2019 1324      2/14/2019             Resp Rate (set):  10                Electronically Signed By: CATHLEEN Monaco CRNA  February 14, 2019  1:24 PM

## 2019-02-14 NOTE — ANESTHESIA PROCEDURE NOTES
Peripheral Nerve Block Procedure Note    Staff:     Anesthesiologist:  Parminder Simmons MD    Referred By:  Howard Eli MD  Location: Pre-op  Procedure Start/Stop TImes:      2/14/2019 11:56 AM     2/14/2019 12:02 PM    patient identified, IV checked, site marked, risks and benefits discussed, informed consent, monitors and equipment checked, pre-op evaluation, at physician/surgeon's request and post-op pain management      Correct Patient: Yes      Correct Position: Yes      Correct Site: Yes      Correct Procedure: Yes      Correct Laterality:  Yes    Site Marked:  Yes  Procedure details:     Procedure:  Supraclavicular    ASA:  1    Laterality:  Right    Position:  Sitting    Sterile Prep: chloraprep, mask and sterile gloves      Local skin infiltration:  None    Needle:  Short bevel and insulated    Needle gauge:  21    Needle length (mm):  110    Ultrasound: Yes      Ultrasound used to identify targeted nerve, plexus, or vascular structure and placed a needle adjacent to it      Permanent Image entered into patiient's record      Abnormal pain on injection: No      Blood Aspirated: No      Paresthesias:  No    Bleeding at site: No      Bolus via:  Needle    Infusion Method:  Single Shot    Complications:  None

## 2019-02-14 NOTE — OR NURSING
Pt received right sided supraclavicular block without exparel in pre op. Pt received 50 mcg fentanyl and 1 mg versed IV. Pt tolerated procedure without immediate complication.

## 2019-02-14 NOTE — BRIEF OP NOTE
[facility]    Brief Operative Note    Pre-operative diagnosis: Right Thumb Metacarpal Fracture  Post-operative diagnosis * No post-op diagnosis entered *  Procedure: Procedure(s):  Closed Reduction Percutaneous Pinning  Surgeon: Surgeon(s) and Role:     * Howard Eli MD - Primary  Anesthesia: Other   Estimated blood loss: 1ml  Drains: None  Specimens: * No specimens in log *  Findings:   Right Tony fracture. Closed reduction with two k-wires.   Complications: None.  Implants: None.    Plan:   Ok to discharge when meeting criteria   R hand in splint with sling    Maximino Razo MD  Plastic Surgery Resident

## 2019-02-14 NOTE — OP NOTE
DATE OF OPERATION: February 14, 2019    PREOPERATIVE DIAGNOSIS: Right thumb Mio fracture.    POSTOPERATIVE DIAGNOSIS: Right thumb Mio fracture.    PROCEDURES PERFORMED: Closed reduction percutaneous pinning of right thumb Mio fracture.    SURGEON: Howard Eli MD    ASSISTANT: Maximino Razo MD    ANESTHESIA: Monitored anesthesia care with regional block.    ESTIMATED BLOOD LOSS: 1 mL    TOURNIQUET TIME: 0 min    FINDINGS: Well reduced articular surface with longitudinal traction, pronation, and abduction of the thumb.    SPECIMENS: None.    COMPLICATIONS: None.    DRAINS: None.    IMPLANTS: 0.045 K wires x2.    INDICATIONS: Patient is a 23-year-old right-hand-dominant male who presented with a subacute right thumb Mio fracture that he sustained while skiing.  Risks benefits alternatives were discussed for closed reduction percutaneous pinning versus open reduction internal fixation of this fracture.  Patient accepted the associated risks and wished to proceed with surgery.    DESCRIPTION OF PROCEDURE: Informed consent was obtained in the preoperative holding area.  The right thumb was marked.  Patient then underwent a regional block with anesthesia.  He was then taken to the operating room and placed in supine position with his right thumb on a hand table.  A tourniquet was applied to the right upper arm, but was never inflated.  The right upper extremity was then prepped and draped circumferentially in a sterile fashion.  Preoperative antibiotics were given.  Sedation was achieved.  A timeout was performed.    Under mini C-arm fluoroscopic guidance, the fracture was reduced with longitudinal traction, pronation, and abduction of the thumb.  Again under fluoroscopic guidance, a 0.045 K wire was driven from the distal thumb metacarpal to the index finger metacarpal.  Then another 0.045 K wire was driven from radial to ulnar through the fracture fragments at the thumb metacarpal base and into the index  finger metacarpal.  This fixated all the fragments and the thumb was in a functional position.  The thumb was pink and well perfused at the end of the case.  K wires were shortened and caps were placed.  Soft dressings were applied and a thumb spica splint was applied.  All counts were correct at the end the case.  Patient was then awakened and transferred to the postoperative area in stable condition.    DISPOSITION: Home.

## 2019-02-22 DIAGNOSIS — M79.646 THUMB PAIN: Primary | ICD-10-CM

## 2019-02-26 ENCOUNTER — ANCILLARY PROCEDURE (OUTPATIENT)
Dept: GENERAL RADIOLOGY | Facility: CLINIC | Age: 24
End: 2019-02-26
Attending: PLASTIC SURGERY
Payer: COMMERCIAL

## 2019-02-26 ENCOUNTER — OFFICE VISIT (OUTPATIENT)
Dept: ORTHOPEDICS | Facility: CLINIC | Age: 24
End: 2019-02-26
Payer: COMMERCIAL

## 2019-02-26 ENCOUNTER — THERAPY VISIT (OUTPATIENT)
Dept: OCCUPATIONAL THERAPY | Facility: CLINIC | Age: 24
End: 2019-02-26
Attending: PLASTIC SURGERY
Payer: COMMERCIAL

## 2019-02-26 DIAGNOSIS — S62.221A CLOSED DISPLACED ROLANDO'S FRACTURE OF RIGHT THUMB, INITIAL ENCOUNTER: ICD-10-CM

## 2019-02-26 DIAGNOSIS — M79.646 THUMB PAIN: ICD-10-CM

## 2019-02-26 DIAGNOSIS — M25.649 THUMB JOINT STIFFNESS: ICD-10-CM

## 2019-02-26 DIAGNOSIS — S62.221A CLOSED DISPLACED ROLANDO'S FRACTURE OF RIGHT THUMB, INITIAL ENCOUNTER: Primary | ICD-10-CM

## 2019-02-26 PROCEDURE — 97760 ORTHOTIC MGMT&TRAING 1ST ENC: CPT | Mod: GO | Performed by: OCCUPATIONAL THERAPIST

## 2019-02-26 PROCEDURE — 97165 OT EVAL LOW COMPLEX 30 MIN: CPT | Mod: GO | Performed by: OCCUPATIONAL THERAPIST

## 2019-02-26 NOTE — PROGRESS NOTES
Hand Therapy Initial Evaluation    Current Date:  2/26/2019    Diagnosis: R thumb MC frature, CRPP  DOI: 02/09/19  DOS: 02/14/19  Procedure:  CRPP  Post:  1w 5d    Precautions: NA    Subjective:  Prince Sofia is a 23 year old right hand dominant male.    Patient reports symptoms of pain, stiffness/loss of motion, weakness/loss of strength, edema, numbness and tingling  of the right thumb which occurred due to alpine skiing, hit hand on gate. Since onset symptoms are Gradually getting better.  Special tests:  x-ray.  Previous treatment: surgery, splinted.    General health as reported by patient is excellent.  Pertinent medical history includes:None  Medical allergies:none.  Surgical history: orthopedic: R thumb, other: wisdom teeth.  Medication history: None.    Occupational Profile Information:  Current occupation is   Currently working in normal job without restrictions  Job Tasks: Computer Work  Prior functional level:  no limitations  Barriers include:none  Mobility: No difficulty  Transportation: drives  Leisure activities/hobbies: skiing, soccer    Objective:  Pain Level Report  VAS(0-10) 2/26/2019   At Rest: 0/10   With Use: 0/10     Edema:  MODERATE  Sensation: Decreased in thumb    ROM  Wrist 2/26/2019   AROM (PROM) right   Extension 62   Flexion 60   RD 15   UD 20   Supination 85   Pronation 77     ROM  Pain Report:  - none    + mild    ++ moderate    +++ severe   Thumb   2/26/2019   AROM  (PROM) right   MP 0/34   IP 0/43   RAB 39   PABD 38       Strength:  Contraindicated    Assessment:  Patient presents with symptoms consistent with diagnosis of right thumb MC fracture, CRPP,  with surgical  intervention.     Patient's limitations or Problem List includes:  Pain, Decreased ROM/motion, Increased edema and Weakness of the right thumb which interferes with the patient's ability to perform Self Care Tasks (dressing, eating, bathing, hygiene/toileting), Work Tasks, Sleep Patterns, Recreational  Activities, Household Chores and Driving  as compared to previous level of function.    Rehab Potential:  Excellent - Return to full activity, no limitations    Patient will benefit from skilled Occupational Therapy to increase ROM, overall strength and stability of thumb and decrease pain and edema to return to previous activity level and resume normal daily tasks and to reach their rehab potential.    Barriers to Learning:  No barrier    Communication Issues:  Patient appears to be able to clearly communicate and understand verbal and written communication and follow directions correctly.    Chart Review: Simple history review with patient    Identified Performance Deficits: bathing/showering, toileting, dressing, feeding, hygiene and grooming, driving and community mobility, health management and maintenance, home establishment and management, meal preparation and cleanup, sleep, work and leisure activities    Assessment of Occupational Performance:  5 or more Performance Deficits    Clinical Decision Making (Complexity): Low complexity    Treatment Explanation:  The following has been discussed with the patient:  RX ordered/plan of care  Anticipated outcomes  Possible risks and side effects    Plan:  Frequency:  1 X a month, once daily  Duration:  for 2 months tapering to 4 X a month over 2 months    Treatment Plan:   Modalities:  US, Fluidotherapy and Paraffin  Therapeutic Exercise:  AROM, AAROM, PROM, Tendon Gliding, Blocking and Stabilization  Neuromuscular re-education:  Nerve Gliding and Kinesiotaping  Manual Techniques:  Joint mobilization, Scar mobilization and Myofascial release  Orthotic Fabrication:  Static orthosis  Self Care:  Self Care Tasks  Discharge Plan:  Achieve all LTG.  Independent in home treatment program.  Reach maximal therapeutic benefit.    Home Exercise Program:  Forearm based thumb spica orthosis    Next Visit:  Orthosis modifications  AROM

## 2019-02-26 NOTE — NURSING NOTE
Reason For Visit:   Chief Complaint   Patient presents with     Right Hand - Surgical Followup     Surgical Followup     2 wk pop DOS 2/14/19 Closed Reduction Percutaneous Pinning - Right       Primary MD: No Ref-Primary, Physician  Ref. MD: est    Age: 23 year old    ?  No      There were no vitals taken for this visit.      Pain Assessment  Patient Currently in Pain: No               QuickDASH Assessment  No flowsheet data found.       Current Outpatient Medications   Medication Sig Dispense Refill     order for DME Equipment being ordered: splint right wrist (Patient not taking: Reported on 2/26/2019) 1 Device 0       Allergies   Allergen Reactions     Sulfa Drugs Other (See Comments)     Childhood reaction       Ina Horvath, ATC

## 2019-02-26 NOTE — LETTER
2/26/2019       RE: Prince Sofia  [address]  [place] [address]     Dear Colleague,    Thank you for referring your patient, Prince Sofia, to the [facility]. Please see a copy of my visit note below.    Patient returns for a postoperative follow-up after undergoing closed reduction percutaneous pinning of right thumb Mio fracture.    INTERVAL HISTORY: Pain is well controlled.  Denies numbness or tingling.    PHYSICAL EXAMINATION:  Dental: No acute distress.  Right hand splint was removed.  On examination, pins are intact.  Pin sites are clean.  There is moderate swelling.  FPL and EPL are intact to the thumb.  Sensation to light touch is intact in both radial and ulnar borders of the thumb tip.    IMAGING: X-ray obtained today reveals congruent articular surface of the first metacarpal base.  K wires are intact.    ASSESSMENT: 1.5 weeks status post closed reduction per case pinning of right thumb Mio fracture.    PLAN: Patient will see hand therapy today for custom splint fabrication and range of motion exercises to the radiocarpal joint and fingers.  Pins will remain in for now.  I will see the patient back in 4 weeks at which time pins may be removed.    Total time spent with patient was 15 min of which greater than 50% was in counseling.    Again, thank you for allowing me to participate in the care of your patient.      Sincerely,    Howard Eli MD

## 2019-02-26 NOTE — LETTER
Date:February 28, 2019      Patient was self referred, no letter generated. Do not send.        [facility] Information

## 2019-02-26 NOTE — PROGRESS NOTES
Patient returns for a postoperative follow-up after undergoing closed reduction percutaneous pinning of right thumb Mio fracture.    INTERVAL HISTORY: Pain is well controlled.  Denies numbness or tingling.    PHYSICAL EXAMINATION:  Dental: No acute distress.  Right hand splint was removed.  On examination, pins are intact.  Pin sites are clean.  There is moderate swelling.  FPL and EPL are intact to the thumb.  Sensation to light touch is intact in both radial and ulnar borders of the thumb tip.    IMAGING: X-ray obtained today reveals congruent articular surface of the first metacarpal base.  K wires are intact.    ASSESSMENT: 1.5 weeks status post closed reduction per case pinning of right thumb Mio fracture.    PLAN: Patient will see hand therapy today for custom splint fabrication and range of motion exercises to the radiocarpal joint and fingers.  Pins will remain in for now.  I will see the patient back in 4 weeks at which time pins may be removed.    Total time spent with patient was 15 min of which greater than 50% was in counseling.

## 2019-03-06 ENCOUNTER — NURSE TRIAGE (OUTPATIENT)
Dept: INTERNAL MEDICINE | Facility: CLINIC | Age: 24
End: 2019-03-06

## 2019-03-06 DIAGNOSIS — S62.221A CLOSED DISPLACED ROLANDO'S FRACTURE OF RIGHT THUMB, INITIAL ENCOUNTER: Primary | ICD-10-CM

## 2019-03-06 NOTE — TELEPHONE ENCOUNTER
Reached out to Dr. Eli to discuss patient's reported symptoms as noted in red flag message. Per Dr. Eli, prescription for Keflex 250 mg capsules by mouth 4 times daily for 14 days. Patient is to do hand soaks out of splint 3 times daily. If patient does not see improvement in the next day or 2, he should call the clinic and be placed on Dr. Eli's clinic schedule for Tuesday, 3/12. Patient was contacted and expressed understanding of the plan. Prescription sent electronically to the [facility] on [address].

## 2019-03-06 NOTE — TELEPHONE ENCOUNTER
[facility]: Nurse Triage Note  SITUATION/BACKGROUND                                                      Prince Sofia is a 23 year old male who calls with increased redness, discomfort and yellow drainage at pin site between base of thumb and index metacarpel.  2/14/2019: Dr Eli performed closed reduction percutaneous pinning of right thumb Mio fracture.    -Redness at lower pin is bothering him pin by  pin metacarpel at base of hand  -Size of reddened area started at bottom pin , has reached upper pin, oval shaped about 25 cent   -Shade is pink to red , with increased pain at the pin site  -Yellow drainage at pin site which is new.    Fever negative: notes more hand sweating than previously sweating with rubber splint, but no systemic complaints.     Cleans area when in shower, washes hands  Allergies:SULFA  Pharmacy: working [facility] now til 5.  [facility] til 5 PM tonight: [facility] and [place].       MEDICATIONS:  None    Allergies:   Allergies   Allergen Reactions     Sulfa Drugs Other (See Comments)     Childhood reaction       ASSESSMENT       Reddened lower pin site at thumb base to metacarpel with new tenderness and some yellow drainage, SP: 2/14/19 Mio fracture repair Dr Eli. See or course of antibiotics?    RECOMMENDATION/PLAN                                                      RECOMMENDED DISPOSITION:  Phone Visit  Will comply with recommendation: Yes    If further questions/concerns or if symptoms do not improve, worsen or new symptoms develop, call your PCP or [phone] to talk with the Resident on call, as soon as possible.    Guideline used: pp.458 postoperative issues.  Telephone Triage Protocols for Nurses, Fifth Edition, Tarsha Nieves RN

## 2019-03-21 ENCOUNTER — TELEPHONE (OUTPATIENT)
Dept: PLASTIC SURGERY | Facility: CLINIC | Age: 24
End: 2019-03-21

## 2019-03-21 NOTE — TELEPHONE ENCOUNTER
Left  informing patient that we need to move his apt on tues 3/26 to 9:30 or 9:45 due to  being in surgery that morning. I leftr our call back number.

## 2019-03-22 DIAGNOSIS — S62.223A: Primary | ICD-10-CM

## 2019-03-26 ENCOUNTER — ANCILLARY PROCEDURE (OUTPATIENT)
Dept: GENERAL RADIOLOGY | Facility: CLINIC | Age: 24
End: 2019-03-26
Attending: PLASTIC SURGERY
Payer: COMMERCIAL

## 2019-03-26 ENCOUNTER — OFFICE VISIT (OUTPATIENT)
Dept: ORTHOPEDICS | Facility: CLINIC | Age: 24
End: 2019-03-26
Payer: COMMERCIAL

## 2019-03-26 ENCOUNTER — THERAPY VISIT (OUTPATIENT)
Dept: OCCUPATIONAL THERAPY | Facility: CLINIC | Age: 24
End: 2019-03-26
Payer: COMMERCIAL

## 2019-03-26 VITALS — WEIGHT: 180 LBS | HEIGHT: 72 IN | BODY MASS INDEX: 24.38 KG/M2

## 2019-03-26 DIAGNOSIS — S62.221A: ICD-10-CM

## 2019-03-26 DIAGNOSIS — M25.649 THUMB JOINT STIFFNESS: Primary | ICD-10-CM

## 2019-03-26 DIAGNOSIS — S62.223A: ICD-10-CM

## 2019-03-26 DIAGNOSIS — S62.221D CLOSED DISPLACED ROLANDO'S FRACTURE OF RIGHT THUMB WITH ROUTINE HEALING, SUBSEQUENT ENCOUNTER: Primary | ICD-10-CM

## 2019-03-26 DIAGNOSIS — S62.221A CLOSED DISPLACED ROLANDO'S FRACTURE OF RIGHT THUMB, INITIAL ENCOUNTER: ICD-10-CM

## 2019-03-26 PROCEDURE — 97763 ORTHC/PROSTC MGMT SBSQ ENC: CPT | Mod: GO | Performed by: OCCUPATIONAL THERAPIST

## 2019-03-26 PROCEDURE — 97110 THERAPEUTIC EXERCISES: CPT | Mod: GO | Performed by: OCCUPATIONAL THERAPIST

## 2019-03-26 ASSESSMENT — MIFFLIN-ST. JEOR: SCORE: 1839.47

## 2019-03-26 NOTE — PROGRESS NOTES
Patient returns for a postoperative follow-up after undergoing closed reduction percutaneous pinning of right thumb Mio fracture.    OPERATION: February 14, 2019  DIAGNOSIS: Right thumb Mio fracture.  PROCEDURES PERFORMED: Closed reduction percutaneous pinning of right thumb Mio fracture.    INTERVAL HISTORY: Pain is well controlled.  Denies numbness or tingling. Ready to start motion exercises and have his pins removed. Having some mild discomfort as he has returned to typing on his computer. No other issues    PHYSICAL EXAMINATION:  Right hand splint was removed.  On examination, pins are intact.  Pin sites are clean.  There is mild swelling.  FPL and EPL are intact to the thumb.  Sensation to light touch is intact in both radial and ulnar borders of the thumb tip.    IMAGING: X-ray obtained today prior to pin removal demonstrates interval healing of the fracture with appropriate callus formation. Smooth K wires x2 remain intact and stable from prior. Joint congruity maintained.     Post Pin removal demonstrates no change in alignment or fracture angulation. Joint congruity stable.     ASSESSMENT: 5.5 weeks status post closed reduction per case pinning of right thumb Mio fracture.    PLAN: Patient will see hand therapy today for custom splint fabrication (thumb spica) and range of motion exercises to the radiocarpal joint and fingers; OK to engage in PROM/AROM of the thumb without resistance.  Pins will be removed today in clinic to allow ROM with therapy. Will then follow up 4 weeks for repeat evaluation and XR Rt Thumb     Total time spent with patient was 15 min of which greater than 50% was in counseling.    Discussed with Dr. Alcira Floyd MD 03/26/2019  Orthopaedic Surgery Resident, PGY-4  Pager: [phone]      Attending attestation:  I agree with the resident's assessment and plan.  Total time spent with patient was 30 min of which greater than 50% was in  counseling.  Howard Eli MD

## 2019-03-26 NOTE — LETTER
Date:March 27, 2019      Patient was self referred, no letter generated. Do not send.        [facility] Health Information

## 2019-03-26 NOTE — LETTER
3/26/2019       RE: Prince Sofia  [address]  [place] [address]     Dear Colleague,    Thank you for referring your patient, Prince Sofia, to the [facility]. Please see a copy of my visit note below.    Patient returns for a postoperative follow-up after undergoing closed reduction percutaneous pinning of right thumb Mio fracture.    OPERATION: February 14, 2019  DIAGNOSIS: Right thumb Mio fracture.  PROCEDURES PERFORMED: Closed reduction percutaneous pinning of right thumb Mio fracture.    INTERVAL HISTORY: Pain is well controlled.  Denies numbness or tingling. Ready to start motion exercises and have his pins removed. Having some mild discomfort as he has returned to typing on his computer. No other issues    PHYSICAL EXAMINATION:  Right hand splint was removed.  On examination, pins are intact.  Pin sites are clean.  There is mild swelling.  FPL and EPL are intact to the thumb.  Sensation to light touch is intact in both radial and ulnar borders of the thumb tip.    IMAGING: X-ray obtained today prior to pin removal demonstrates interval healing of the fracture with appropriate callus formation. Smooth K wires x2 remain intact and stable from prior. Joint congruity maintained.     Post Pin removal demonstrates no change in alignment or fracture angulation. Joint congruity stable.     ASSESSMENT: 5.5 weeks status post closed reduction per case pinning of right thumb Mio fracture.    PLAN: Patient will see hand therapy today for custom splint fabrication (thumb spica) and range of motion exercises to the radiocarpal joint and fingers; OK to engage in PROM/AROM of the thumb without resistance.  Pins will be removed today in clinic to allow ROM with therapy. Will then follow up 4 weeks for repeat evaluation and XR Rt Thumb     Total time spent with patient was 15 min of which greater than 50% was in counseling.    Discussed  with Dr. Alcira Floyd MD 03/26/2019  Orthopaedic Surgery Resident, PGY-4  Pager: [phone]      Attending attestation:  I agree with the resident's assessment and plan.  Total time spent with patient was 30 min of which greater than 50% was in counseling.  Howard Eli MD        Again, thank you for allowing me to participate in the care of your patient.      Sincerely,    Howard Eli MD

## 2019-03-26 NOTE — PROGRESS NOTES
SOAP note objective information for 3/26/2019.      Diagnosis: R thumb MC frature, CRPP  DOI: 02/09/19  DOS: 02/14/19  Procedure:  CRPP  Post:  6 weeks, pins out 3/26/19    Precautions: still in FATSS requesting HBTSS from MD today    Initial Subjective:  Prince Sofia is a 23 year old right hand dominant male.  Patient reports symptoms of pain, stiffness/loss of motion, weakness/loss of strength, edema, numbness and tingling  of the right thumb which occurred due to alpine skiing, hit hand on gate.   S: see  flowsheet  Occupational Profile Information:  Current occupation is   Currently working in normal job without restrictions  Job Tasks: Computer Work  Transportation: drives  Leisure activities/hobbies: skiing, soccer    Objective:  Pain Level Report  VAS(0-10) 2/26/2019 3/26/19   At Rest: 0/10    With Use: 0/10 2/10 when tried to open a door and tweaked the hand     Edema:  Mild moderate/ has coban and dressings on after pins out today  Sensation: Normal motion    ROM  Wrist 2/26/2019 3/26   AROM (PROM) right right   Extension 62 62   Flexion 60 60   RD 15    UD 20    Supination 85    Pronation 77      ROM  Pain Report:  - none    + mild    ++ moderate    +++ severe   Thumb   2/26/2019 3/26   AROM  (PROM) right right   MP 0/34    IP 0/43    RAB 39 43   PABD 38 45   Opposition  4/10       Strength:  Contraindicated    A:  Response to therapy has been improvement to:  ROM of Thumb:  All Planes    Overall Assessment:  Patient is ready to progress to next level of protocol.   Patient would benefit from continued therapy to achieve rehab potential  STG/LTG:  See goal sheet for details and updates of remaining functional limitations.         Plan:  P:  Frequency/Duration:  Recommend continuing with the current treatment plan. 1 X week, once daily  for 8 weeks    Recommendations for Continued Therapy  Treatment Plan:    Additions to Treatment Plan -  Start AROM thumb all planes 3/26/2019,         Treatment  Plan:   Modalities:  US, Fluidotherapy and Paraffin  Therapeutic Exercise:  AROM, AAROM, PROM, Tendon Gliding, Blocking and Stabilization  Neuromuscular re-education:  Nerve Gliding and Kinesiotaping  Manual Techniques:  Joint mobilization, Scar mobilization and Myofascial release  Orthotic Fabrication:  Static orthosis  Self Care:  Self Care Tasks  Discharge Plan:  Achieve all LTG.  Independent in home treatment program.  Reach maximal therapeutic benefit.    Home Exercise Program:  Forearm based thumb spica orthosis  3/26/2019  AROM per MD  Out of splint 3-5x/day for exercises    Next Visit:  Orthosis modifications, maybe to hand based if ok per MD  Scar massage   Progress Thumb stability, aggie FDI and web release

## 2019-03-26 NOTE — NURSING NOTE
Reason For Visit:   Chief Complaint   Patient presents with     Right Hand - RECHECK       Primary MD: No Ref-Primary, Physician  Ref. MD: est    Age: 23 year old    ?  NO      Ht 1.829 m (6')   Wt 81.6 kg (180 lb)   BMI 24.41 kg/m        Pain Assessment  Patient Currently in Pain: Yes  0-10 Pain Scale: 2               QuickDASH Assessment  No flowsheet data found.       Current Outpatient Medications   Medication Sig Dispense Refill     order for DME Equipment being ordered: splint right wrist 1 Device 0       Allergies   Allergen Reactions     Sulfa Drugs Other (See Comments)     Childhood reaction       Ina Horvath, ATC

## 2019-04-19 DIAGNOSIS — S62.221D CLOSED DISPLACED ROLANDO'S FRACTURE OF RIGHT THUMB WITH ROUTINE HEALING, SUBSEQUENT ENCOUNTER: Primary | ICD-10-CM

## 2019-04-23 ENCOUNTER — ANCILLARY PROCEDURE (OUTPATIENT)
Dept: GENERAL RADIOLOGY | Facility: CLINIC | Age: 24
End: 2019-04-23
Attending: PLASTIC SURGERY
Payer: COMMERCIAL

## 2019-04-23 ENCOUNTER — OFFICE VISIT (OUTPATIENT)
Dept: ORTHOPEDICS | Facility: CLINIC | Age: 24
End: 2019-04-23
Attending: PLASTIC SURGERY
Payer: COMMERCIAL

## 2019-04-23 VITALS — HEIGHT: 72 IN | WEIGHT: 180 LBS | BODY MASS INDEX: 24.38 KG/M2

## 2019-04-23 DIAGNOSIS — S62.221A CLOSED DISPLACED ROLANDO'S FRACTURE OF RIGHT THUMB, INITIAL ENCOUNTER: Primary | ICD-10-CM

## 2019-04-23 ASSESSMENT — MIFFLIN-ST. JEOR: SCORE: 1839.47

## 2019-04-23 NOTE — LETTER
4/23/2019       RE: Prince Sofia  [address]  [place] [address]     Dear Colleague,    Thank you for referring your patient, Prince Sofia, to the [facility] at [facility]. Please see a copy of my visit note below.    Patient returns for a postoperative follow-up after undergoing closed reduction percutaneous pinning of right thumb Mio fracture.    OPERATION: February 14, 2019  DIAGNOSIS: Right thumb Mio fracture.  PROCEDURES PERFORMED: Closed reduction percutaneous pinning of right thumb Mio fracture.    INTERVAL HISTORY: Pain is well controlled.  Denies numbness or tingling.  Doing well with range of motion exercises.  He is using his right hand as tolerated.    PHYSICAL EXAMINATION:  General: No acute distress.  On examination of the right hand, pin sites have healed appropriately.  There is minimal swelling.  Patient is able to oppose the small finger.  Patient is able to extend and flex the IP joint of the thumb.  EPL is intact at the wrist.  Sensation to light touch is intact along the dorsum and volar aspects of the thumb.  Upon passive ranging of the CMC joint, there is a slightly noticeable click that is nontender.  Fracture site is nontender and is stable.    IMAGING: Healing right thumb Mio fracture with CMC joint congruency.    ASSESSMENT: 2.5 months status post closed reduction percutaneous pinning of right thumb Mio fracture.  Healing appropriately.    PLAN: Patient is to continue with therapy exercises.  I have no activity restrictions for the patient.  I will see him back as needed.    Total time spent with patient was 15 min of which greater than 50% was in counseling.        Again, thank you for allowing me to participate in the care of your patient.      Sincerely,    Howard Eli MD

## 2019-04-23 NOTE — NURSING NOTE
Reason For Visit:   Chief Complaint   Patient presents with     Right Hand - RECHECK, Surgical Followup     Surgical Followup     2 mo pop DOS 2/14/19 Closed Reduction Percutaneous Pinning - Right       Primary MD: No Ref-Primary, Physician  Ref. MD: est     Age: 23 year old    ?  No      Ht 1.829 m (6')   Wt 81.6 kg (180 lb)   BMI 24.41 kg/m        Pain Assessment  Patient Currently in Pain: No               QuickDASH Assessment  No flowsheet data found.       Current Outpatient Medications   Medication Sig Dispense Refill     order for DME Equipment being ordered: splint right wrist 1 Device 0       Allergies   Allergen Reactions     Sulfa Drugs Other (See Comments)     Childhood reaction       Ina Horvath, ATC

## 2019-04-23 NOTE — LETTER
Date:April 24, 2019      Patient was self referred, no letter generated. Do not send.        [facility] Health Information

## 2019-04-23 NOTE — PROGRESS NOTES
Patient returns for a postoperative follow-up after undergoing closed reduction percutaneous pinning of right thumb Mio fracture.    OPERATION: February 14, 2019  DIAGNOSIS: Right thumb Mio fracture.  PROCEDURES PERFORMED: Closed reduction percutaneous pinning of right thumb Mio fracture.    INTERVAL HISTORY: Pain is well controlled.  Denies numbness or tingling.  Doing well with range of motion exercises.  He is using his right hand as tolerated.    PHYSICAL EXAMINATION:  General: No acute distress.  On examination of the right hand, pin sites have healed appropriately.  There is minimal swelling.  Patient is able to oppose the small finger.  Patient is able to extend and flex the IP joint of the thumb.  EPL is intact at the wrist.  Sensation to light touch is intact along the dorsum and volar aspects of the thumb.  Upon passive ranging of the CMC joint, there is a slightly noticeable click that is nontender.  Fracture site is nontender and is stable.    IMAGING: Healing right thumb Mio fracture with CMC joint congruency.    ASSESSMENT: 2.5 months status post closed reduction percutaneous pinning of right thumb Mio fracture.  Healing appropriately.    PLAN: Patient is to continue with therapy exercises.  I have no activity restrictions for the patient.  I will see him back as needed.    Total time spent with patient was 15 min of which greater than 50% was in counseling.

## 2019-05-15 ENCOUNTER — COMMUNICATION - HEALTHEAST (OUTPATIENT)
Dept: INTERNAL MEDICINE | Facility: CLINIC | Age: 24
End: 2019-05-15

## 2019-10-04 ENCOUNTER — HEALTH MAINTENANCE LETTER (OUTPATIENT)
Age: 24
End: 2019-10-04

## 2019-11-12 PROBLEM — S62.221A: Status: RESOLVED | Noted: 2019-02-12 | Resolved: 2019-11-12

## 2019-11-12 PROBLEM — M25.649 THUMB JOINT STIFFNESS: Status: RESOLVED | Noted: 2019-02-26 | Resolved: 2019-11-12

## 2019-11-12 NOTE — PROGRESS NOTES
Discharge Summary - Hand Therapy    Patient did not return to therapy.  Assume all goals were met to patient's satisfaction. D/C from hand therapy.

## 2020-11-14 ENCOUNTER — HEALTH MAINTENANCE LETTER (OUTPATIENT)
Age: 25
End: 2020-11-14

## 2021-05-31 VITALS — BODY MASS INDEX: 23.98 KG/M2 | WEIGHT: 177 LBS | HEIGHT: 72 IN

## 2021-05-31 VITALS — BODY MASS INDEX: 24.01 KG/M2 | WEIGHT: 177 LBS

## 2021-05-31 VITALS — WEIGHT: 177 LBS | HEIGHT: 72 IN | BODY MASS INDEX: 23.98 KG/M2

## 2021-05-31 VITALS — WEIGHT: 180.6 LBS | BODY MASS INDEX: 25.01 KG/M2

## 2021-06-01 VITALS — WEIGHT: 177.3 LBS | BODY MASS INDEX: 24.01 KG/M2 | HEIGHT: 72 IN

## 2021-06-01 VITALS — WEIGHT: 182.5 LBS | BODY MASS INDEX: 24.75 KG/M2

## 2021-06-02 VITALS — WEIGHT: 174 LBS | BODY MASS INDEX: 23.57 KG/M2 | HEIGHT: 72 IN

## 2021-06-12 NOTE — PROGRESS NOTES
"Diagnostic Assessment  [x] Brief  [] Standard    **Date(s): 2017  **Start Time:  8:00  **Stop Time: 10:00    Patient Name: Prince Sofia  **Age: 21 y.o.    1995        Referral Source:   Therapist: SUZAN Barroso        Persons Present: Prince Sofia and SUZAN Barroso        Patient expectation for treatment:   Referral to psychiatry for medication evaluation. Wants Better mood, more libido, more energy, no headaches.    Recipient's description of symptoms (including reason for referral):   Headaches that are dull aches just behind his forehead several times a week that seem to correspond with lower mood.  Low sex drive for a long time, irritability for the past 2-3 months and greater fatigue.  Patient also reports some periods of low mood followed by periods of elevated mood each of which last for 2 weeks at a time.  He also reports feeling disengaged from other people and having it difficult time \"dealing with feelings for others\" like his girlfriend.    Presenting Problem/History:    Functional Impairments:   Personal: 2  Family: 2  Social:2    Work: 1      How does the presenting problem affect patients daily functioning:    Patient reports that it gets in the way of him developing relationships that feel as deep as he would like.  He reports that he lacks energy and interest in things that used to interest him.  He reports greater irritability and headaches that make it more challenging to do things and enjoy life.  He is not as active as he would like to be and he tends to sleep in late on weekends so that he does not do as much as he like to do.  He would like to have a more active sex life with his girlfriend and his low libido bothers him.    Issues/Stressors:   Patient works full-time but enjoys his work he has recently bought a house and is doing some renovation work on the house.  He has a girlfriend for the past 10 months.  He would like to " get his finances in order.    Mental Status Exam:  Grooming: Well groomed  Attire: Appropriate  Age: Appears Stated  Behavior Towards Examiner: Cooperative  Motor Activity: Within normal   Eye Contact: Appropriate  Mood: Euthymic  Affect: Congruent w/content of speech  Speech/Language: Within normal  Attention: Within normal  Concentration: Within normal  Thought Process: Within normal  Thought Content: Within noraml  Within normal  Orientation: X 3No Evidence of Impairment  Memory: No Evidence of Impairment  Judgement: No Evidence of Impairment  Estimated Intelligence: Average  Demonstrated Insight: Adequate  Fund of Knowledge: adequate    Current living situation (including household membership and housing status):   Patient lives in his own home that he bought recently and he has a roommate whom he gets along with well who pays rent and helps with the mortgage.  He feels secure and stable and at home.  He is doing some renovation work on the basement himself.    Basic needs status including economic status:   Patient is doing all right financially.  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  He has some debt and has plans to work out of it and set money aside ultimately in savings.    Education level:   Patient graduated from the [employer] with bachelor's degree.    Employment status:   Patient is employed full-time in [employer] to carry on.  He enjoys the job and is not worried about job stressors.    Significant personal relationships (including recipient's evaluation of relationship quality:  Patient has a girlfriend for the past 10 months they get along well together.  He is close with his mother and father they are both alive [place] he also has a brother Roger and who is his same age and is the son of his stepfather and they get along well.  He has 2 younger brothers.  He gets along well with all family members.  He is close with his roommate and has some other  "friends and work colleagues to.    Strengths and resources (including extent and quality of social networks):  Patient reports strengths include good logic good math skills, good working with others, ability to stay focused, understanding new material quickly, good at soccer, skiing.  His weaknesses include \"frustration when things go wrong\".  Dealing with feelings for others.    Belief system:  Patient does not believe or brace any religions or spirituality.  He is an independent individual who lives life on a sensing basis and is not interested in pursuing Mu-ism or spirituality at this time.  He does not report having a God or higher power.    Contextual non-personal factors contributing to the recipient's presenting concerns:  Patient was born of his current mother and a Chinese father.  He was born in [place] and his father left the family when patient was 1 years old.  He does not know his father and is not interested in getting to know him.  He has half Chinese and half German or .  He feels comfortable with himself and acknowledges that he is sometimes \"played the \" and another situations \"played the \".  He notes that he is not accepted by either ethnic group at times and feels that he is neither Chinese nor fully .  He has learned to shift between the 2 and integrate or adapt to these different faces and expectations.    General physical health and relationship to recipient's culture:  Pt culturally is open to western medical strategies, techniques and treatments. he is comfortable using medicines and working with medical doctors in this country. he has fransisca in western medicine and uses medical doctors and psychotherapy to treat physical and emotional concerns.  He denies any medical problems aside from fatigue and headaches.  See epic for Dr. Martinez's notes.  He had a physical earlier this month.    Current medications:  Patient is not taking any " medications.    Substance use, abuse, or dependency:  Pt denies any substance use problems or concerns.  He scored 0 of 4 on the cage aid screening.  He reports recreational use of alcohol drinking 2-3 drinks once or twice a week.  He has used marijuana but not recently.  He reports his marijuana use has diminished and that he now uses only on monthly basis.  He denies any other substance use or abuse.  Sources/references used in completing this assessment: (face-to-face interview, Patient chart, adult intake questionnaire, etc.)  Face-to-Face interview, Patient chart, adult intake questionnaire    **Psychological Measures:  1. PANSI: Positive ideation score=4.2 >3.4; Negative ideation score= 1<1.6.  Patient denies suicidal thoughts and/or planning and commits to seeking safety if his is unsafe in the community.  OR  Patient endorses suicidal ideation with not planning.  Patient agrees to safety plan and commits to seeking safety if he becomes unsafe in community.  2. CAGE Aid= score of    0/4    Patient is not enrolled in chemical dependency program and denies substance use problem; no referral made at this time.  3.  WHODAS: none  4. PHQ-9=score of 4  Patient indicates that their depression sxs make it somewhat difficult to do his work, take care of things at home, or get along with other people.  5. PCL-5=not admin  6. ASHLY-7=score of 2 Patient indicates that their anxiety sxs make it somewhat difficult to do his work, take care of things at home, or get along with other people.  7. Mood Disorder Questionnaire (Lifetime)=2 NO s and 11 Yes responses. Patient indicates it is: not difficult at all,to manage symptoms  8. Mood Disorder Questionnaire (Current)= 13 NO s and 0 Yes responses. Patient indicates it is: no problem,.    WHODAS 2.0 12 Score -item version= 02% no problem  H1= 0  H2= 0  H3= 0  In the last 30 days, patient's level of disability was at 2%       Medical History  Past Medical History:   Diagnosis  Date     Acne     accutane               Clinical Impressions/Assessment/Recommendations: (Stands alone; is a synopsis of patients story, any impacting family or cultural issue on diagnosis and how patient meets criteria for diagnosis).     Prince Sofia provided background information via the Adult Intake Questionnaire, psychological measures (scores are documented at the beginning of this DA), and face-to-face interview.  [facility] were [facility] to complete this DA.  The patient was referred to this therapist by Irving Kapadia, *.      Prince Sofia is a 21 y.o. White or  male presenting to therapy for assistance with low mood, low libido, low energy, and headaches.  The patient advises his desired outcomes of therapy are to improve mood and libido, to become more energized and to diminish frequency of headaches.  Prince Sofia indicates his difficulties with low mood and more depressed feelings began 2-3 months ago patient reports his libido is been low for a long time.  He has had headaches for the past 2-3 months that are typically concurrent with low moods.  Patient reports symptoms of depression that do not meet criteria for clinical diagnosis of depression tend to occur cyclically and last anywhere from a week to a month before remitting and sometimes swinging 2 periods of a week to month of elevated mood..  The patient has attempted to eliminate or manage these problems by behavioral techniques including good diet, exercise, involvement in sporting activities, talking with people about his issues..  The patient reports limited success in managing their mental health concerns.      Prince reports increased symptoms of depression and some anxiety and low libido are more problematic lately.  He reports his mood is been lower the past 3 months.  He also reports greater irritability with some temper outbursts and a loss of interest in activities that he used to find  interesting.  He has decreased his social activity and is experiencing headaches on a weekly basis that coincide with his depressed mood.  Reports these headaches occur behind his forehead and are dull aches they can last many hours or until he gets involved with work or other activities that allow him to forget about the headaches.  He is unable to identify any triggers for headaches or depression or anxiety.  He did buy a house the past several months and is able to make payments on that house.  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  He has a friend who pays rent at his house so that helps him with the mortgage.  He reports some financial concerns and that he has been unable to save money but these are not major stressors for him.  He reports that he is pretty close to his family and that his family life growing up was comfortable.  Both of his parents are alive and he continues to have contact with them.  They have been  for 20 years.  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  He reports weaknesses include frustration when things go wrong, and dealing with feelings for others.  He states it is sometimes difficult to empathize with other people or to be patient with their difficulties.  He reports support network include his parents 1 of his best friends who is his roommate and his girlfriend.  Patient denies any history of mental health in his family thinks that his aunt may have had some type of mental health issue but never diagnosed.  His girlfriend has depression and is talked to him about seeing a therapist and psychiatrist to assess whether he has depression and whether medications might be helpful.    Prince was born in [facility] his mother is an American  of  "Angolan ancestry and his father was Chinese.  His father left the family when patient was 1 years old and patient has never met his father and does not have any interest in learning more about him.  He considers his stepfather to be his real father.  He reports an ability to adapt to his mixed ancestry and does not feel like has mixed ancestry has created problems in his life for led to any prejudice or belongings on his part for knowledge of his true father.  He sees himself as an introvert and he notes that sometimes he has played the role of the \"\" i.e. someone who is very smart and goal-directed.  He is also able to the \"American\" with more spontaneity.  He does note that because of his mixed ancestry he has never been completely accepted her integrated into the  or the American community.  He did spend time a couple of years ago in [facility] and [facility] and had a good time traveling around those areas and noticed that he was not seen as an  or an American.  This was a growth experience for him and he has always felt accepted in comfortable and respected by family members and friends in this country.    Prince has never taken any psychiatric drugs he is not sure whether medications would help but he wants a referral to psychiatry to explore whether there might be a medication that could improve his mood without lowering his energy or libido.  He may benefit from Wellbutrin but is hoping that any medication he takes will have few negative side effects.  He reports responsible use of alcohol drinking 2-3 beers once to twice a week usually on weekends.  He reports some use of marijuana for the last used a couple of weeks ago and states that his use of marijuana is decreased over the past year or 2.  He denies any other substance use and denies any substance abuse he denies any substance use issues in his family system.  His screens for depression and anxiety were subclinical, he did identify a " lifetime history of periods where he had some symptoms of quang.  On his lifetime mood disorder questionnaire he scored 11 yeses and to nose.  However he stated that these positive symptoms of quang did not cause any problems in his life.  He did report that his depressed symptoms can last for a week to a month and are often followed by periods of elevated mood.  The symptoms of quang that he identified occurring in his lifetime included irritability greater self-confidence needing less sleep being more talkative than usual having racing thoughts being easily distractible having more energy than usual being more active than usual being more social and outgoing than usual and doing excessive risky things that were out of the ordinary.  He has not had any of those symptoms in the past year ago.  Based on the information gathered in this diagnostic assessment, the patient's reported symptoms meet criteria for the following DSM-5 Diagnosis and associated rule-outs:      Diagnosis:     Cyclothymic Disorder      Rule outs include:    Persistent Depressive Disorder    Adjustment Disorder      BPAD Luis II    It is recommended that the patient be referred to psychiatry for med eval and med management.  Also, the patient has been provided with the option to see this therapist or another therapist for individual therapy.  At this point patient does not want to do individual therapy but was provided with this therapist telephone number in case he changes his mind.    Prince Sofia would be best serviced by therapeutic interventions that provide a client centered atmosphere with positive regard.  In addition, the patient has been referred to psychiatry for psych and med eval.      Assessment of client resolving presenting mental health concerns:  Ability  [] low     [x] average     [] high  Motivation [] low     [x] average     [] high  Willingness [] low     [x] average     [] high        Initial Therapy Plan (ex: develop  therapeutic relationship with therapist, Refer to psychiatry/psych testing, etc.):    1. Attend psychiatric appointment to discuss mental health sx and medication options                Therapist s Signature/Supervision/co-signature statement:   Performed and documented by ROSALBA Irene, [facility]

## 2021-06-12 NOTE — PROGRESS NOTES
"Prince Louismo  1995      Assessment and Plan:  1. Probably some type of depressive equivalent/anxiety- doubt \"low T\" which is girlfriends concern  2. Fatigue - likely related to #1  3. Acne- off accutane which is good idea now given #1    Plan: routine labs and doubt low T issue then if all ok trial of SSRI trying to avoid sedative side effects       Chief Complaint: low libido    Visit diagnoses:    1. Low libido  Testosterone, Total   2. Fatigue  Thyroid Stimulating Hormone (TSH)    Comprehensive Metabolic Panel    HM2(CBC w/o Differential)   3. Healthcare maintenance  Tdap vaccine,  8yo or older,  IM   4. Acne vulgaris         Meds:  No current outpatient prescriptions on file.     No current facility-administered medications for this visit.        Allergies   Allergen Reactions     Sulfa (Sulfonamide Antibiotics) Hives       ROS: complete review of symptoms otherwise negative except as noted below    S: lack of interest in sex and fatigue. Girl friend of 6 months thinks he has \"low T\" and wants him tested. 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 He enjoys that in winter months. May have lost interest in other things. No suicidal ideation. Girlfriend has some depression issues and she worries about him. On accutane but discontinued several months ago       O:   Vitals:    07/24/17 1622   BP: 106/60   Patient Site: Left Arm   Patient Position: Sitting   Cuff Size: Adult Regular   Pulse: (!) 43; 50 reg   Weight: 180 lb 9.6 oz (81.9 kg)       Physical Exam:  General- pleasant soft spoken man with  features (biologic dad Chinese);   VS- see above  HEENT- neg   Neck- no adenopathy/thyromegaly/bruits  Chest- clear   Cor- reg no murmurs/gallops/ectopics; rate apical ~ 50   Abdomen- soft non tender, no masses; no organomegaly  Extremities: no edema, good distal pulses  Neuro- Cr. NN-  intact, alert, perhaps somewhat flat affect. thoughful answers. Low animation in response        Irving Kapadia " MD      Time 25 min >50 % counseling/coordination care

## 2021-06-13 NOTE — PROGRESS NOTES
Correct pharmacy verified with patient and confirmed in snapshot? [x] yes []no    Charge captured ? [x] yes  [] no    Medications Phoned  to Pharmacy [] yes [x]no  Name of Pharmacist:  List Medications, including dose, quantity and instructions      Medication Prescriptions given to patient   [] yes  [x] no   List the name of the drug the prescription was written for.       Medications ordered this visit were e-scribed.  Verified by order class [x] yes  [] no  Wellbutrin XL 150mg    Medication changes or discontinuations were communicated to patient's pharmacy: [] yes  [x] no    UA collected [] yes  [x] no    [place] Prescription Monitoring Program Reviewed? [x] yes  [] no    Referrals were made to:  psychotherapy    Future appointment was made: [] yes  [x] no    Dictation completed at time of chart check: [x] yes  [] no    I have checked the documentation for today s encounters and the above information has been reviewed and completed.

## 2021-06-13 NOTE — PROGRESS NOTES
Date of Service:  2017    Name:  Prince Sofia  :  1995  MRN:  [id number]    HPI:   Prince Sofia is a 21 y.o. male with no prior history of mental illnesses or use of psychiatric medications who presents to the clinic at the referral of his primary care provider for evaluation and treatment of depression symptoms.  Patient reports that for the past 6 months or so he has been feeling more depressed and his girlfriend has told him that he gets more irritable has no motivation is more tired.  He also notices that he has less libido and is less interested in having sex with his girlfriend.  Will be to participate in concerns for him.  He tells me that he had his PCP check testosterone levels on the within normal limits.  He believes that his depression is causing him to have a low libido and he is very concerned about this and wants treatment for depression.  He also has complaints of headaches that he describes as occurring at the same time when he is feeling down and depressed.  I did ask that he checks with his primary care provider to address the headaches issues.  Today he is interested in starting a medication that will help him deal with his symptoms of depression aforementioned above.  Today he did not endorse any symptoms of quang or hypomania on inquiry.  He reports that he has been sleeping well and when he does not sleep well he is tired.  He did report some irritability but denies any racing thoughts he denies any grandiose ideas and denies engaging in highly risky behaviors   Among other symptoms of quang or hypomania.  His therapist has diagnosed him with cyclothymia.  Given that today he is no presenting with RN dosing to any symptoms of quang or hypomania I will at this point focus on his request to treat his current symptoms which appear to be more of depression and monitor him closely in the next 4-6 weeks to see if symptoms of quang or hypomania  will emerge which may make a  clear diagnosis of cyclothymia more clear.  We had an extensive discussion on the use of antidepressant and the side effects profile which most of them have the sexual side effects.  We settled on Wellbutrin which has fewer sexual side effects as an option to treat his current symptoms.  I will therefore start him on Wellbutrin extended release 150 mg daily.  I also did discuss the importance of psychotherapy and made a referral for psychotherapy which he is willing to pursue and will be calling to make an appointment for an intake with a therapist.  I also did discuss the FDA warning of all antidepressants including Wellbutrin of suicidal ideations and activation suicidal feelings especially for people in his age group of 25 years old and under .  He endorsed understanding and knows to call 911 the clinic our emergency department should he have any overwhelming feelings of suicidal ideations.  Currently he is denying any suicidal ideations.  He denies quang or hypomania symptoms.  Denies delusions hallucinations.  Endorses some depression.  Denies suicidal homicidal ideations.  He tells me he feels safe and verbally contracts for safety  I will have him come back in 6 weeks I advised him to call in between visits with any questions or concerns.  Psychiatric History:  Current psychiatrist:  None   Current psychotherapist: None   Current : None   Diagnoses:   Hospitalizations: Denies   Suicide attempts: Denies   Current medications:  None   Electroconvulsive therapy:  None   Judicial commitments:  None         Chemical use History:             Alcohol Use - 1-3 a week- Alcohol use not an issues  Mariajuana Use Disorder : Last Use 6 months ago - 2-3 a week . Has used fr couple years          Past Medical History:           Patient Active Problem List   Diagnosis     Acne          Past Medical History:   Diagnosis Date     Acne     accutane       No past surgical history on file.     Family Psychiatric  "History:      Mental illness: Denies   Addiction:  Denies   Suicide: Denies       Social History:       Marital Status :  Dating   Number of children: Denies   Current living circumstances:  [place] ,with a roommate - in his house   Current sources of financial support: Full time - [employer]          History:  Denied  service.    Access to weapons  Denies access to weapons.           Trauma & Abuse History:  Major accidents and injuries: Denies   Concussion or traumatic brain injury: Denies   Abuse: Denies     Spiritual History:  Sources of hope, meaning, comfort, strength, peace and love: \" Music, sports\"   Part of an organized Denominational: None     Birth & Development History:  City and state of birth: [facility] lived  till 4 years , then moved back to [place]   Highest education achieved: BS degree     Legal History:  Denies       [place] Prescription Monitoring Program:  No worrisome pharmacy activity.  Not indicated for this patient.    Medications:   None currently.      No current outpatient prescriptions on file prior to visit.     No current facility-administered medications on file prior to visit.        Lab Results:   Personally reviewed and discussed with the patient    Lab Results   Component Value Date    WBC 4.0 07/24/2017    HGB 14.1 07/24/2017    HCT 41.3 07/24/2017     07/24/2017    CHOL 165 12/15/2016    TRIG 98 12/15/2016    HDL 48 12/15/2016    ALT 15 07/24/2017    AST 19 07/24/2017     07/24/2017    K 4.4 07/24/2017     07/24/2017    CREATININE 1.02 07/24/2017    BUN 16 07/24/2017    CO2 25 07/24/2017    TSH 1.78 07/24/2017     No results found for: PHENYTOIN, PHENOBARB, VALPROATE, CBMZ        Vital signs:    Vitals:    09/27/17 1009   BP: 124/65   Patient Site: Right Arm   Patient Position: Sitting   Cuff Size: Adult Large   Pulse: 75   Temp: 98.2  F (36.8  C)   TempSrc: Oral   Weight: 177 lb (80.3 kg)   Height: 6' (1.829 m)     Allergies:   Sulfa (sulfonamide " antibiotics)        Associated Clinical Documents:       Notes reviewed in EPIC and [facility] [place] including: medication reconciliation, progress notes, recent labs, PMH, and OSH records.    ROS:       10 point ROS was negative except for the items listed in HPI.  No Medication s/e's      MSE:      Alert & oriented x 3.   Appearance: Appears stated age, casually dressed.  Speech: Normal rate, rhythm and tone.  Gait: Normal.  Musculoskeletal: Normal strength, no abnormal movements.  Mood/Affect: Neutral.  Thought Process: Normal rate, logical.  Thought Content: No suicide or homicide ideation.  Associations: Intact, no delusions.  Perceptions: No hallucinations.  Memory: recent and remote memory intact.  Attention span and concentration: normal.  Language: Intact.  Fund of Knowledge: Normal.  Insight and Judgement: Adequate.    Clinical Outcome Measures:  1. PHQ-9: Total score :5  2. ASHLY-7: Total score :4    Impression:      Depression    R/cyclothymia    Plan:         Patient and I reviewed diagnosis and treatment plan.   Reviewed risks/benefits of medication with patient.  Ongoing education given regarding diagnostic and treatment options with adequate verbalization of understanding  Patient agrees with following recommendations:    1. Headaches : Defer to PCP  2.Start Wellbutrin  mg daily - depression   3.Ambulatory Referral to Psychotherapy   4.RTC- 6 weeks, call in between visit with questions or concerns    Total Time:      60 Minutes spent on this visit with >50% time spent on  discussing and educating patient about diagnosis, treatment options, risks, benefits ,side effects of medications and instructions for follow up.  Time also spent on reviewing  EHR, documentation and entering orders.      This dictation was completed with speech recognition software and there may be unintended word substitutions.

## 2021-06-14 NOTE — PROGRESS NOTES
Pt is here for psychiatric med management follow up. Client endorses having less headaches, his mood is mostly equal, only had two bad days last week.   He denies any recent drug or THC use.     [facility] [place] Date: 17  Query Report Page#: 1  Patient Rx History Report  TREVOR [facility]ANNA  Search Criteria: Last Name 'trevor' and First Name 'leslee' and  =  and Request Period =  to    *N/R N=New R=Refill  +MED Daily  Patients that match search criteria  ----------------------------------------------------------------------------------------------------  No patient records available  **Per CDC guidance, the conversion factors and associated daily morphine milligram equivalents for drugs prescribed as part of  medication-assisted treatment for opioid use disorder should not be used to benchmark against dosage thresholds meant for opioids  prescribed for pain.    Correct pharmacy verified with patient and confirmed in snapshot? [x] yes []no    Charge captured ? [x] yes  [] no    Medications Phoned  to Pharmacy [x] yes []no  Name of Pharmacist:  List Medications, including dose, quantity and instructions      Medication Prescriptions given to patient   [] yes  [x] no   List the name of the drug the prescription was written for.       Medications ordered this visit were e-scribed.  Verified by order class [] yes  [x] no    Medication changes or discontinuations were communicated to patient's pharmacy: [] yes  [x] no    UA collected [] yes  [x] no    [place] Prescription Monitoring Program Reviewed? [x] yes  [] no    Referrals were made to: none     Future appointment was made: [x] yes  [] no    Dictation completed at time of chart check: [x] yes  [] no    I have checked the documentation for today s encounters and the above information has been reviewed and completed.

## 2021-06-14 NOTE — PROGRESS NOTES
"Psychiatric  Progress Note  Date of visit:11/8/2017         Discussion of Care and Treatment Recommendations:   This is a 22 y.o. male newly diagnosed with depression  presenting to the clinic for a follow up appointment for education management       Last visit  9/27/17  Recommendation at last visit .  1. Headaches : Defer to PCP  2.Start Wellbutrin  mg daily - depression   3.Ambulatory Referral to Psychotherapy   4.RTC- 6 weeks, call in between visit with questions or concerns  Patient and I reviewed diagnosis and treatment plan and patient agrees with following recommendations:  Ongoing education given regarding diagnostic and treatment options with adequate verbalization of understanding.    Plan   1. Continue Wellbutrin  mg daily - depression  2- Recommend : Psychotherapy : pt not interested at this time   3-RTC- 8 weeks, call in between visit with questions or concerns         DIagnoses:   Depression  R/cyclothymia    Patient Active Problem List   Diagnosis     Acne         Chief Complaint / Subjective:    Chief complaint: \" I am doing better \"     History of Present Illness:   Per patient's statement : He feels like he is doing well with initiation of Wellbutrin.  He denies side effects.  Tells me that his sexual libido has improved and is feeling less depressecontinues to have some headaches that he noticed that his blood pressure was elevated today.  I did remind him to call his PCP and notify her of the elevation in blood pressure and also the headaches.  He offers no other complaints.  He denies suicidal homicidal ideations.  He denies delusions or hallucinations.  He denies paranoia.  Denies anxiety.  Reports improved depression.  I will have him return in 8 weeks for follow-up appointment meanwhile I did remind him to call in between visits with any questions or concerns.  He tells me he feels safe.  He does not appear to be in any apparent distress.  He is not interested in " psychotherapy    Mental Status Examination:   General: Adequate hygiene, cooperative  Speech: Normal in rate and tone  Language: Intact  Thought process: Coherent  Thought content:                           Auditory hallucinations- absent                           Visual Hallucinations - Absent                           Delusions Absent                           Loose Associations:  No                          Suicidal thoughts: Absent                          Homicidal thoughts: Absent                       Affect: Neutral  Mood: Neutral   Intellectual functioning: Within normal limits  Memory: Within normal limits  Fund of knowledge: Average  Attention and concentration: Within normal limits  Gait: Steady  Psychomotor activity: Calm, no agitation  Muscles: No atrophy, no abnormal movements  InSight and judgment: Fair    Medication changes: See Above   Medication adherence: compliant  Medication side effects: absent  Information about medications: Side effects, benefits and alternative treatments discussed and patient agrees with capacity to do so.    Psychotherapy: Supportive therapy day-to-day living    Education: Diet, exercise, abstinence from drugs and alcohol, patient will not drive if sedated and medications or  under influence of any substance    Lab Results:   Personally reviewed and discussed with the patient    Lab Results   Component Value Date    WBC 4.0 07/24/2017    HGB 14.1 07/24/2017    HCT 41.3 07/24/2017     07/24/2017    CHOL 165 12/15/2016    TRIG 98 12/15/2016    HDL 48 12/15/2016    ALT 15 07/24/2017    AST 19 07/24/2017     07/24/2017    K 4.4 07/24/2017     07/24/2017    CREATININE 1.02 07/24/2017    BUN 16 07/24/2017    CO2 25 07/24/2017    TSH 1.78 07/24/2017     Vital signs:  Vitals:    11/08/17 1332   BP: 148/66   Patient Site: Right Arm   Patient Position: Sitting   Cuff Size: Adult Regular   Pulse: 72   Temp: 97.5  F (36.4  C)   TempSrc: Oral   Weight: 177 lb (80.3  kg)   Height: 6' (1.829 m)     Allergies: Sulfa (sulfonamide antibiotics)         Medications:     Current Outpatient Prescriptions on File Prior to Visit   Medication Sig Dispense Refill     buPROPion (WELLBUTRIN XL) 150 MG 24 hr tablet Take 1 tablet (150 mg total) by mouth daily. 30 tablet 1     No current facility-administered medications on file prior to visit.                   Review of Systems:    Otherwise reminder of review of systems is negative    Coordination of Care:   More than 25 minutes spent on this visit  with more than 50% of time spent on coordination of care including: Educating patient about diagnosis, prognosis, side effects and benefits of medications, diet, exercise.  Time also spent on entering orders and preparing documentation for the visit.Time also spent providing supportive therapy regarding above issues.    This note was created using a dictation system. All typing errors or contextual distortion is unintentional and software inherent.

## 2021-06-18 NOTE — PROGRESS NOTES
Assessment/ Plan  1. Daytime sleepiness  The patient who snores  Is not clear to me that sleep apnea is the diagnosis here, will refer to sleep medicine  TSH was done last year, was having symptoms at this time  - Ambulatory referral to Sleep Medicine    Body mass index is 24.75 kg/(m^2).    Subjective  CC:  Chief Complaint   Patient presents with     Fatigue     Almost all the time - Effecting work and nothing helps him stay awake      HPI:  22-year-old presents with daytime somnolence.  This been a problem for years, always been sleepy in the afternoon but gotten much worse over the last couple of months.  Usually gets 6-1/2-7 a half hours asleep at night, sometimes less, sometimes more but more does not seem to help a great deal.  Snores at night and pretty severely according to girlfriend and previous roommates.  Has not noted stopping breathing.  Unclear whether he wakes up feeling well rested first thing in the morning but gets pretty tired early on.  Has difficulty staying awake and board rooms.  No cataplexy.  Did have his knees buckle once when he was falling asleep when he stood up.    Denies symptoms of anxiety or depression.  Was treated in the past with Wellbutrin at one point.  He works in a [employer] company, is a  for downhill skiing.  No history of traffic accidents, is careful about this.  Especially when he is driving back from skiing at night, there been times where his had to pull off and sleep for some time before proceeding.    Otherwise is healthy.  Drinks 1 cup of coffee in the morning.  No problem with alcohol, no other drugs.  Patient Active Problem List   Diagnosis     Acne     Current medications reviewed as follows:  Current Outpatient Prescriptions on File Prior to Visit   Medication Sig     [DISCONTINUED] buPROPion (WELLBUTRIN XL) 150 MG 24 hr tablet TAKE 1 TABLET (150 MG TOTAL) BY MOUTH DAILY.     No current facility-administered medications on file prior to visit.       History   Smoking Status     Never Smoker   Smokeless Tobacco     Never Used     Social History     Social History Narrative    Grew up in [place] several siblings. Mom/step dad in [place]. Biologic dad Chinese and not involved. Non smoker occ ETOH- no hx problems. [employer] '[employer] works for [employer] in [employer] dept. Has girlfriend past 8 months (July '17); lives in [place] area; plays soccer, alpine skiing/coaches skiing@ [employer]      Patient Care Team:  Irving Kapadia MD as PCP - General (Internal Medicine)  ROS  Full 10 system review including constitutional, respiratory, cardiac, gi, urinary, rheumatologic, neurologic, reproductive, dermatologic psychiatric is  performed (via questionnaire) and is negative         Objective  Physical Exam  Vitals:    05/17/18 1603   BP: 112/76   Patient Site: Right Arm   Patient Position: Sitting   Cuff Size: Adult Large   Pulse: 60   Resp: 16   Temp: 97.9  F (36.6  C)   TempSrc: Oral   Weight: 182 lb 8 oz (82.8 kg)     Oral cavity is examined and appears fairly normal except he does have enlarged tonsils.  There is ample space for an airway.  Chest is clear, cardiovascular exam normal.  Extremities are without edema.  He is not overweight  Diagnostics  Reviewed labs from 1 year ago which were normal    Please note: Voice recognition software was used in this dictation.  It may therefore contain typographical errors.

## 2021-06-19 NOTE — PROGRESS NOTES
Dear Dr. Antoine Valverde Md  [address] [place] [id number]    Thank you for the opportunity to participate in the care of Mr. Prince Sofia.    He is a 22 y.o. male who comes to the clinic with a chief complaint of excessive daytime sleepiness that is been going on for more than 5 years.  While the patient denies any episodes of witnessed apnea, his girlfriend is complaining that he does snore loudly during sleep and even occasionally choking during sleep.  The patient also admits that he is a night owl and does admit to not sleeping too much.  The patient's review of systems otherwise unremarkable.     Ideal Sleep-Wake Cycle(devoid of societal pressure):    Patient would try to initiate sleep at around midnight to 1 PM with a sleep latency of less than 5 minutes. The patient would have 1-3 awakening. Final wake up time is around 10 AM.      Past Medical History  Past Medical History:   Diagnosis Date     Acne     accutane        Past Surgical History  History reviewed. No pertinent surgical history.     Meds  No current outpatient prescriptions on file.     No current facility-administered medications for this visit.         Allergies  Sulfa (sulfonamide antibiotics)     Social History  Social History     Social History     Marital status: Single     Spouse name: N/A     Number of children: N/A     Years of education: N/A     Occupational History     IT. [employer]     Social History Main Topics     Smoking status: Never Smoker     Smokeless tobacco: Never Used     Alcohol use Yes      Comment: couple drinks every weekend     Drug use: Yes     Special: Marijuana, Heroin      Comment: last use spring of 2017     Sexual activity: Yes     Partners: Female     Birth control/ protection: IUD      Comment: girlfriend has IUD     Other Topics Concern     Not on file     Social History Narrative    Grew up in [place] several siblings. Mom/step dad in [place]. Biologic dad Chinese and not involved. Non  smoker occ ETOH- no hx problems. [employer] '[employer] works for [employer] in [employer] dept. Has girlfriend past 8 months (July '17); lives in [place] area; plays soccer, alpine skiing/coaches skiing@ [employer]        Family History  Family History   Problem Relation Age of Onset     Hypertension Maternal Grandfather         Review of Systems:  Constitutional: Negative except as noted in HPI.   Eyes: Negative except as noted in HPI.   ENT: Negative except as noted in HPI.   Cardiovascular: Negative except as noted in HPI.   Respiratory: Negative except as noted in HPI.   Gastrointestinal: Negative except as noted in HPI.   Genitourinary: Negative except as noted in HPI.   Musculoskeletal: Negative except as noted in HPI.   Integumentary: Negative except as noted in HPI.   Neurological: Negative except as noted in HPI.   Psychiatric: Negative except as noted in HPI.   Endocrine: Negative except as noted in HPI.   Hematologic/Lymphatic: Negative except as noted in HPI.      STOP BANG 7/9/2018   Do you snore loudly (louder than talking or loud enough to be heard through closed doors)? 1   Do you often feel tired, fatigued, or sleepy during daytime? 1   Has anyone observed you stop breathing in your sleep? 1   Do you have or are you being treated for high blood pressure? 0   BMI more than 35 kg/m2 0   Age over 50 years old? 0   Neck circumference greater than 16 inches? 0   Gender male? 1   Total Score 4   Epworths Sleepiness Scale 7/9/2018   Sitting and reading 2   Watching TV 1   Sitting, inactive in a public place (e.g. a theatre or a meeting) 3   As a passenger in a car for an hour without a break 3   Lying down to rest in the afternoon when circumstances permit 3   Sitting and talking to someone 0   Sitting quietly after a lunch without alcohol 2   In a car, while stopped for a few minutes in traffic 0   Total score 14   Rooming 7/9/2018   Usual bedtime 12   Sleep Latency immediately   Awakenings 1-3   Wake Up Time 720am   Energy  "Drinks 0   Coffee 1-3   Cola 0   Difficulty falling asleep No   Difficulty staying asleep Yes   Excessive daytime tiredness Yes   Excessive daytime sleepiness Yes   Dozing off while driving Yes   Shift Worker No   Sleep Walking? No   Sleep Talking? No   Kicking or punching? No   Restless legs symptoms No       Physical Exam:  /66  Pulse (!) 56  Ht 6' (1.829 m)  Wt 177 lb 4.8 oz (80.4 kg)  SpO2 98%  BMI 24.05 kg/m2  BMI:Body mass index is 24.05 kg/(m^2).   GEN: NAD, appropriate for age  Head: Normocephalic.  EYES: PERRLA, EOMI  ENT: Oropharynx is clear, 2+tonsils, mallampatti class 2+ airway. Uvula is intact  Nasal mucosa is moist without erythema  Neck : Thyroid is within normal limits. Neck circ 14.5\"  CV: Regular rate and rhythm, S1 & S2 positive.  LUNGS: Bilateral breathsounds heard.   ABDOMEN: Positive bowel sounds in all quadrants, soft, no rebound or guarding  MUSCULOSKELETAL: No leg swelling  SKIN: warm, dry, no rashes  Neurological: Alert, oriented to time, place, and person.  Psych: normal mood, normal affect     Labs/Studies:     Lab Results   Component Value Date    WBC 4.0 07/24/2017    HGB 14.1 07/24/2017    HCT 41.3 07/24/2017    MCV 87 07/24/2017     07/24/2017         Chemistry        Component Value Date/Time     07/24/2017 1654    K 4.4 07/24/2017 1654     07/24/2017 1654    CO2 25 07/24/2017 1654    BUN 16 07/24/2017 1654    CREATININE 1.02 07/24/2017 1654     07/24/2017 1654        Component Value Date/Time    CALCIUM 9.3 07/24/2017 1654    ALKPHOS 86 07/24/2017 1654    AST 19 07/24/2017 1654    ALT 15 07/24/2017 1654    BILITOT 0.5 07/24/2017 1654            No results found for: FERRITIN  Lab Results   Component Value Date    TSH 1.78 07/24/2017         Assessment and Plan:  In summary Prince Sofia is a 22 y.o. year old male here for sleep disturbance.  1.  Hypersomnia   Mr. Prince Sofia has high risk for obstructive sleep apnea based on the history of " hypersomnia, snoring and a crowded airway. I educated the patient on the underlying pathophysiology of obstructive sleep apnea. We reviewed the risks associated with sleep apnea, including increased cardiovascular risk and overall death. We talked about treatments briefly. I recommend getting a Home sleep study or an split-night nocturnal polysomnography.  The patient would prefer the latter.  The patient should return to the clinic to discuss results and treatment option in a patient-centered approach.  2.  Snoring  3.  Other sleep disturbance  4. Elevated blood pressure reading  I will have the patient's blood pressure readings repeated during this clinic visit. I strongly advised the patient to follow up with PCP in one week.    Patient verbalized understanding of these issues, agrees with the plan and all questions were answered today. Patient was given an opportuntity to voice any other symptoms or concerns not listed above. Patient did not have any other symptoms or concerns.      Patient told to return in one week after the sleep study is interpreted.      Kev Avila DO  Board Certified in Internal Medicine and Sleep Medicine  [facility].    (Note created with Dragon voice recognition and unintended spelling errors and word substitutions may occur)

## 2021-06-20 NOTE — PROGRESS NOTES
Dear Dr. Irving Kapadia MD  [address] [place] [address],    Thank you for the opportunity to participate in the care of Prince Sofia.     He is a 22 y.o.  male patient who comes to the sleep medicine clinic for review of his sleep study. The study was completed on 08/01/18 which showed the the patient had mild snoring but it was otherwise an unremarkable sleep study.    No current outpatient prescriptions on file.     No current facility-administered medications for this visit.        Allergies   Allergen Reactions     Sulfa (Sulfonamide Antibiotics) Hives and Other (See Comments)     Childhood reaction       Physical Exam:  /75  Pulse (!) 54  Ht 6' (1.829 m)  Wt 174 lb (78.9 kg)  SpO2 100%  BMI 23.6 kg/m2  BMI:Body mass index is 23.6 kg/(m^2).   GEN: NAD, obese  Psych: normal mood, normal affect     Labs/Studies:  - We reviewed the results of the overnight PSG as described on the HPI.     Lab Results   Component Value Date    WBC 4.0 07/24/2017    HGB 14.1 07/24/2017    HCT 41.3 07/24/2017    MCV 87 07/24/2017     07/24/2017         Chemistry        Component Value Date/Time     07/24/2017 1654    K 4.4 07/24/2017 1654     07/24/2017 1654    CO2 25 07/24/2017 1654    BUN 16 07/24/2017 1654    CREATININE 1.02 07/24/2017 1654     07/24/2017 1654        Component Value Date/Time    CALCIUM 9.3 07/24/2017 1654    ALKPHOS 86 07/24/2017 1654    AST 19 07/24/2017 1654    ALT 15 07/24/2017 1654    BILITOT 0.5 07/24/2017 1654            No results found for: FERRITIN        Assessment and Plan:  In summary Prince Sofia is a 22 y.o. year old male here for review of his sleep study.  1.  Circadian phase delay  I educated the patient on the underlying pathophysiology of circadian phase delay.  We discussed the option of initiating low-dose melatonin to prime his central nervous system to be receptive to the concept of sleep and an earlier period of time.  We also  discussed using light therapy upon awakening.  2.  Poor sleep hygiene  I educated the patient on proper sleep hygiene stressing the importance of stimulus control.  We also discussed some techniques on how to regulate his thought process he is trying to fall asleep.  I will give him a handout on both topics.  3.  Hypersomnia     Patient verbalized understanding of these issues, agrees with the plan and all questions were answered today. Patient was given an opportuntity to voice any other symptoms or concerns not listed above. Patient did not have any other symptoms or concerns.        Kev Avila DO  Board Certified in Internal Medicine and Sleep Medicine  [facility].    We spent a total of 25 minutes of face-to-face encounter and more than 50% of the encounter was used for counseling or coordination of care.    (Note created with Dragon voice recognition and unintended spelling errors and word substitutions may occur)

## 2021-09-12 ENCOUNTER — HEALTH MAINTENANCE LETTER (OUTPATIENT)
Age: 26
End: 2021-09-12

## 2022-01-02 ENCOUNTER — HEALTH MAINTENANCE LETTER (OUTPATIENT)
Age: 27
End: 2022-01-02

## 2022-10-30 ENCOUNTER — HEALTH MAINTENANCE LETTER (OUTPATIENT)
Age: 27
End: 2022-10-30

## 2023-04-08 ENCOUNTER — HEALTH MAINTENANCE LETTER (OUTPATIENT)
Age: 28
End: 2023-04-08

## 2024-06-15 ENCOUNTER — HEALTH MAINTENANCE LETTER (OUTPATIENT)
Age: 29
End: 2024-06-15

## (undated) DEVICE — ESU HOLSTER PLASTIC DISP E2400

## (undated) DEVICE — CAST PLASTER SPLINT 4X15" 7394

## (undated) DEVICE — TOURNIQUET SGL BLADDER 18"X4" RED 5921-218-135

## (undated) DEVICE — Device

## (undated) DEVICE — GLOVE PROTEXIS POWDER FREE 7.5 ORTHOPEDIC 2D73ET75

## (undated) DEVICE — CAST PADDING 4" STERILE 9044S

## (undated) DEVICE — DRAPE STERI TOWEL LG 1010

## (undated) DEVICE — LINEN DRAPE 54X72" 5467

## (undated) DEVICE — PIN GUARD 0.045 WHITE C-045

## (undated) DEVICE — SOL NACL 0.9% IRRIG 500ML BOTTLE 2F7123

## (undated) DEVICE — DRAPE C-ARM OEC MINI VIEW 6800   00-901917-01

## (undated) DEVICE — GOWN XLG DISP 9545

## (undated) DEVICE — GLOVE PROTEXIS BLUE W/NEU-THERA 7.5  2D73EB75

## (undated) DEVICE — LINEN TOWEL PACK X5 5464

## (undated) DEVICE — SLING ARM LG 79-99157

## (undated) DEVICE — SUCTION MANIFOLD NEPTUNE 2 SYS 1 PORT 702-025-000

## (undated) DEVICE — PACK HAND CUSTOM ASC

## (undated) DEVICE — PREP CHLORAPREP 26ML TINTED ORANGE  260815

## (undated) RX ORDER — FENTANYL CITRATE 50 UG/ML
INJECTION, SOLUTION INTRAMUSCULAR; INTRAVENOUS
Status: DISPENSED
Start: 2019-02-14

## (undated) RX ORDER — GABAPENTIN 300 MG/1
CAPSULE ORAL
Status: DISPENSED
Start: 2019-02-14

## (undated) RX ORDER — ACETAMINOPHEN 325 MG/1
TABLET ORAL
Status: DISPENSED
Start: 2019-02-14

## (undated) RX ORDER — CEFAZOLIN SODIUM 2 G/50ML
SOLUTION INTRAVENOUS
Status: DISPENSED
Start: 2019-02-14